# Patient Record
Sex: MALE | Race: BLACK OR AFRICAN AMERICAN | NOT HISPANIC OR LATINO | Employment: FULL TIME | URBAN - METROPOLITAN AREA
[De-identification: names, ages, dates, MRNs, and addresses within clinical notes are randomized per-mention and may not be internally consistent; named-entity substitution may affect disease eponyms.]

---

## 2017-07-12 ENCOUNTER — ALLSCRIPTS OFFICE VISIT (OUTPATIENT)
Dept: OTHER | Facility: OTHER | Age: 52
End: 2017-07-12

## 2017-08-06 DIAGNOSIS — Z00.00 ENCOUNTER FOR GENERAL ADULT MEDICAL EXAMINATION WITHOUT ABNORMAL FINDINGS: ICD-10-CM

## 2017-08-06 DIAGNOSIS — Z12.5 ENCOUNTER FOR SCREENING FOR MALIGNANT NEOPLASM OF PROSTATE: ICD-10-CM

## 2017-08-06 DIAGNOSIS — R79.89 OTHER SPECIFIED ABNORMAL FINDINGS OF BLOOD CHEMISTRY: ICD-10-CM

## 2017-08-08 ENCOUNTER — ALLSCRIPTS OFFICE VISIT (OUTPATIENT)
Dept: OTHER | Facility: OTHER | Age: 52
End: 2017-08-08

## 2017-10-20 ENCOUNTER — TRANSCRIBE ORDERS (OUTPATIENT)
Dept: ADMINISTRATIVE | Facility: HOSPITAL | Age: 52
End: 2017-10-20

## 2017-10-20 DIAGNOSIS — G47.30 INSOMNIA WITH SLEEP APNEA: Primary | ICD-10-CM

## 2017-10-20 DIAGNOSIS — G47.00 INSOMNIA WITH SLEEP APNEA: Primary | ICD-10-CM

## 2017-10-26 ENCOUNTER — HOSPITAL ENCOUNTER (OUTPATIENT)
Dept: SLEEP CENTER | Facility: CLINIC | Age: 52
Discharge: HOME | End: 2017-10-26
Payer: COMMERCIAL

## 2017-10-26 DIAGNOSIS — G47.00 INSOMNIA WITH SLEEP APNEA: ICD-10-CM

## 2017-10-26 DIAGNOSIS — G47.30 INSOMNIA WITH SLEEP APNEA: ICD-10-CM

## 2017-10-26 PROCEDURE — 95810 POLYSOM 6/> YRS 4/> PARAM: CPT

## 2017-11-04 ENCOUNTER — TRANSCRIBE ORDERS (OUTPATIENT)
Dept: ADMINISTRATIVE | Age: 52
End: 2017-11-04

## 2017-11-04 ENCOUNTER — APPOINTMENT (OUTPATIENT)
Dept: LAB | Age: 52
End: 2017-11-04
Payer: COMMERCIAL

## 2017-11-04 DIAGNOSIS — R79.89 OTHER SPECIFIED ABNORMAL FINDINGS OF BLOOD CHEMISTRY: ICD-10-CM

## 2017-11-04 DIAGNOSIS — Z00.00 ENCOUNTER FOR GENERAL ADULT MEDICAL EXAMINATION WITHOUT ABNORMAL FINDINGS: ICD-10-CM

## 2017-11-04 DIAGNOSIS — Z12.5 ENCOUNTER FOR SCREENING FOR MALIGNANT NEOPLASM OF PROSTATE: ICD-10-CM

## 2017-11-04 LAB
ALBUMIN SERPL BCP-MCNC: 4.1 G/DL (ref 3.5–5)
ALP SERPL-CCNC: 58 U/L (ref 46–116)
ALT SERPL W P-5'-P-CCNC: 32 U/L (ref 12–78)
ANION GAP SERPL CALCULATED.3IONS-SCNC: 9 MMOL/L (ref 4–13)
AST SERPL W P-5'-P-CCNC: 28 U/L (ref 5–45)
BASOPHILS # BLD AUTO: 0.04 THOUSANDS/ΜL (ref 0–0.1)
BASOPHILS NFR BLD AUTO: 1 % (ref 0–1)
BILIRUB SERPL-MCNC: 0.65 MG/DL (ref 0.2–1)
BUN SERPL-MCNC: 12 MG/DL (ref 5–25)
CALCIUM SERPL-MCNC: 9.1 MG/DL (ref 8.3–10.1)
CHLORIDE SERPL-SCNC: 108 MMOL/L (ref 100–108)
CHOLEST SERPL-MCNC: 205 MG/DL (ref 50–200)
CO2 SERPL-SCNC: 24 MMOL/L (ref 21–32)
CREAT SERPL-MCNC: 0.59 MG/DL (ref 0.6–1.3)
EOSINOPHIL # BLD AUTO: 0.13 THOUSAND/ΜL (ref 0–0.61)
EOSINOPHIL NFR BLD AUTO: 4 % (ref 0–6)
ERYTHROCYTE [DISTWIDTH] IN BLOOD BY AUTOMATED COUNT: 12.4 % (ref 11.6–15.1)
GFR SERPL CREATININE-BSD FRML MDRD: 115 ML/MIN/1.73SQ M
GLUCOSE P FAST SERPL-MCNC: 83 MG/DL (ref 65–99)
HCT VFR BLD AUTO: 41.8 % (ref 36.5–49.3)
HDLC SERPL-MCNC: 73 MG/DL (ref 40–60)
HGB BLD-MCNC: 15 G/DL (ref 12–17)
LDLC SERPL CALC-MCNC: 100 MG/DL (ref 0–100)
LYMPHOCYTES # BLD AUTO: 1.16 THOUSANDS/ΜL (ref 0.6–4.47)
LYMPHOCYTES NFR BLD AUTO: 32 % (ref 14–44)
MCH RBC QN AUTO: 34.6 PG (ref 26.8–34.3)
MCHC RBC AUTO-ENTMCNC: 35.9 G/DL (ref 31.4–37.4)
MCV RBC AUTO: 96 FL (ref 82–98)
MONOCYTES # BLD AUTO: 0.4 THOUSAND/ΜL (ref 0.17–1.22)
MONOCYTES NFR BLD AUTO: 11 % (ref 4–12)
NEUTROPHILS # BLD AUTO: 1.91 THOUSANDS/ΜL (ref 1.85–7.62)
NEUTS SEG NFR BLD AUTO: 52 % (ref 43–75)
NRBC BLD AUTO-RTO: 0 /100 WBCS
PLATELET # BLD AUTO: 241 THOUSANDS/UL (ref 149–390)
PMV BLD AUTO: 9.8 FL (ref 8.9–12.7)
POTASSIUM SERPL-SCNC: 3.8 MMOL/L (ref 3.5–5.3)
PROT SERPL-MCNC: 7.4 G/DL (ref 6.4–8.2)
PSA SERPL-MCNC: 2.8 NG/ML (ref 0–4)
RBC # BLD AUTO: 4.34 MILLION/UL (ref 3.88–5.62)
SODIUM SERPL-SCNC: 141 MMOL/L (ref 136–145)
TRIGL SERPL-MCNC: 158 MG/DL
WBC # BLD AUTO: 3.65 THOUSAND/UL (ref 4.31–10.16)

## 2017-11-04 PROCEDURE — G0103 PSA SCREENING: HCPCS

## 2017-11-04 PROCEDURE — 85025 COMPLETE CBC W/AUTO DIFF WBC: CPT

## 2017-11-04 PROCEDURE — 36415 COLL VENOUS BLD VENIPUNCTURE: CPT

## 2017-11-04 PROCEDURE — 80053 COMPREHEN METABOLIC PANEL: CPT

## 2017-11-04 PROCEDURE — 80061 LIPID PANEL: CPT

## 2017-11-05 ENCOUNTER — GENERIC CONVERSION - ENCOUNTER (OUTPATIENT)
Dept: OTHER | Facility: OTHER | Age: 52
End: 2017-11-05

## 2017-12-05 ENCOUNTER — ALLSCRIPTS OFFICE VISIT (OUTPATIENT)
Dept: OTHER | Facility: OTHER | Age: 52
End: 2017-12-05

## 2017-12-05 ENCOUNTER — TRANSCRIBE ORDERS (OUTPATIENT)
Dept: ADMINISTRATIVE | Facility: HOSPITAL | Age: 52
End: 2017-12-05

## 2017-12-05 ENCOUNTER — GENERIC CONVERSION - ENCOUNTER (OUTPATIENT)
Dept: OTHER | Facility: OTHER | Age: 52
End: 2017-12-05

## 2017-12-05 DIAGNOSIS — M54.12 RADICULOPATHY OF CERVICAL REGION: ICD-10-CM

## 2017-12-05 DIAGNOSIS — H53.2 DIPLOPIA: ICD-10-CM

## 2017-12-05 DIAGNOSIS — M54.12 BRACHIAL NEURITIS: Primary | ICD-10-CM

## 2017-12-05 DIAGNOSIS — R53.83 OTHER FATIGUE: ICD-10-CM

## 2017-12-08 ENCOUNTER — GENERIC CONVERSION - ENCOUNTER (OUTPATIENT)
Dept: INTERNAL MEDICINE CLINIC | Facility: CLINIC | Age: 52
End: 2017-12-08

## 2017-12-11 ENCOUNTER — TRANSCRIBE ORDERS (OUTPATIENT)
Dept: ADMINISTRATIVE | Facility: HOSPITAL | Age: 52
End: 2017-12-11

## 2017-12-11 ENCOUNTER — GENERIC CONVERSION - ENCOUNTER (OUTPATIENT)
Dept: OTHER | Facility: OTHER | Age: 52
End: 2017-12-11

## 2017-12-11 DIAGNOSIS — H49.11 FOURTH NERVE PALSY OF RIGHT EYE: ICD-10-CM

## 2017-12-11 DIAGNOSIS — H53.2 DOUBLE VISION: Primary | ICD-10-CM

## 2017-12-12 ENCOUNTER — APPOINTMENT (OUTPATIENT)
Dept: LAB | Age: 52
End: 2017-12-12
Payer: COMMERCIAL

## 2017-12-12 ENCOUNTER — HOSPITAL ENCOUNTER (OUTPATIENT)
Dept: RADIOLOGY | Age: 52
Discharge: HOME | End: 2017-12-12
Payer: COMMERCIAL

## 2017-12-12 ENCOUNTER — TRANSCRIBE ORDERS (OUTPATIENT)
Dept: ADMINISTRATIVE | Age: 52
End: 2017-12-12

## 2017-12-12 DIAGNOSIS — H49.11 FOURTH NERVE PALSY OF RIGHT EYE: ICD-10-CM

## 2017-12-12 DIAGNOSIS — H53.2 DIPLOPIA: Primary | ICD-10-CM

## 2017-12-12 DIAGNOSIS — H53.2 DIPLOPIA: ICD-10-CM

## 2017-12-12 DIAGNOSIS — M54.12 RADICULOPATHY OF CERVICAL REGION: ICD-10-CM

## 2017-12-12 LAB
CRP SERPL QL: <3 MG/L
ERYTHROCYTE [SEDIMENTATION RATE] IN BLOOD: 6 MM/HOUR (ref 0–10)
TSH SERPL DL<=0.05 MIU/L-ACNC: 2.32 UIU/ML (ref 0.36–3.74)

## 2017-12-12 PROCEDURE — 36415 COLL VENOUS BLD VENIPUNCTURE: CPT

## 2017-12-12 PROCEDURE — 84443 ASSAY THYROID STIM HORMONE: CPT

## 2017-12-12 PROCEDURE — 86140 C-REACTIVE PROTEIN: CPT

## 2017-12-12 PROCEDURE — 85652 RBC SED RATE AUTOMATED: CPT

## 2017-12-12 PROCEDURE — 72141 MRI NECK SPINE W/O DYE: CPT

## 2017-12-12 NOTE — PROGRESS NOTES
Assessment    1  Cervical radiculopathy (723 4) (M54 12)   2  Carpal tunnel syndrome, unspecified laterality (354 0) (G56 00)   · EMG done on 4/24/2012 with left sided carpal tunnel syndrome  Plan   Carpal tunnel syndrome, unspecified laterality    · 1 - Jaki LOERA, Paul Pitts  (Orthopedic Surgery) Co-Management  *  Status: Active Requested for: 02CNS6948  Care Summary provided  : Yes  * MRI CERVICAL SPINE WO CONTRAST; Status:Need Information - Financial Authorization; Requested for:93Xnt1390;  Perform:[facility] Radiology; [phone]; Ordered; For:Cervical radiculopathy; Ordered By:Breonna Browning;    Discussion/Summary    See dr Paola Mott cervical spine  Chief Complaint  Patient presents today for neck tenderness with left arm numbness  History of Present Illness  HPI: patient had calcium deposit in the Cervical spinemonths of bilateral 3,4,5th finger numbnesswhen lying on his sidelying on his stomach with a pillowon a hard surface bothers it too  been diagnosed with vertical diplopia, needs to see dr Óscar Mitchell      Review of Systems   Constitutional: no fever,-- not feeling poorly,-- no chills-- and-- not feeling tired  ENT: no earache,-- no nosebleeds-- and-- no nasal discharge  Cardiovascular: the heart rate was not slow,-- no chest pain,-- the heart rate was not fast-- and-- no palpitations  Respiratory: no shortness of breath,-- no cough,-- no wheezing-- and-- no shortness of breath during exertion  Gastrointestinal: no abdominal pain,-- no nausea,-- no constipation-- and-- no diarrhea  Genitourinary: no dysuria  Musculoskeletal: no arthralgias-- and-- no myalgias  Integumentary: no rashes,-- no itching,-- no skin lesions-- and-- no skin wound  Neurological: no headache,-- no numbness-- and-- no dizziness  Active Problems    1  Abnormal LFTs (liver function tests) (790 6) (R79 89)   2  Allergic rhinitis (477 9) (J30 9)   3   Anxiety disorder (300 00) (F41 9)   4  Carpal tunnel syndrome, unspecified laterality (354 0) (G56 00)   5  Degeneration of cervical intervertebral disc (722 4) (M50 90)   6  Encounter for screening for malignant neoplasm of colon (V76 51) (Z12 11)   7  Fatigue (780 79) (R53 83)   8  Gastroesophageal reflux disease (530 81) (K21 9)   9  Laboratory examination ordered as part of a routine general medical examination (V72 62) (Z00 00)   10  Long term use of drug (V58 69) (Z79 899)   11  Need for diphtheria-tetanus-pertussis (Tdap) vaccine (V06 1) (Z23)   12  Need for prophylactic vaccination against single diseases (V05 9) (Z23)   13  Rosacea (695 3) (L71 9)   14  Snoring (786 09) (R06 83)   15  Special screening examination for neoplasm of prostate (V76 44) (Z12 5)    Past Medical History  Active Problems And Past Medical History Reviewed: The active problems and past medical history were reviewed and updated today  Surgical History  Surgical History Reviewed: The surgical history was reviewed and updated today  Social History     · Current non-drinker of alcohol (V49 89) (Z78 9)   · Exercising Regularly   ·    · Never a smoker   · Non smoker / tobacco user (V49 89) (Z78 9)   · Two children  The social history was reviewed and updated today  The social history was reviewed and is unchanged  Family History  Family History Reviewed: The family history was reviewed and updated today  Current Meds   1  ALPRAZolam 0 25 MG Oral Tablet; TAKE  1 TABLET TWICE DAILY AS NEEDED; Therapy: 05Zso9143 to (Evaluate:06Nov2017); Last Rx:91Hem5221 Ordered   2  Fluticasone Propionate 50 MCG/ACT Nasal Suspension; Use 1 to 2 sprays to both nostrils daily during allergy season  Avoid spraying the nasal septum; Therapy: 87Trd5524 to (Evaluate:13Oct2016)  Requested for: 00DCM2193; Last Rx:19Oct2015 Ordered   3  Omeprazole 40 MG Oral Capsule Delayed Release; take 1 capsule daily in the morning;  Therapy: 22GCE2903 to (([phone])  Requested for: 68Hbd2035; Last Rx: 58SUV3250 Ordered    The medication list was reviewed and updated today  Allergies  1  No Known Drug Allergies  2  Seasonal    Vitals   Recorded: 98MYO4393 04:07PM   Heart Rate 82   Respiration 18   Systolic 342   Diastolic 66   Height 5 ft 9 in   Weight 176 lb 0 6 oz   BMI Calculated 26   BSA Calculated 1 96   O2 Saturation 95       Physical Exam   Constitutional  General appearance: No acute distress, well appearing and well nourished  Eyes  Conjunctiva and lids: No swelling, erythema, or discharge  Pupils and irises: Equal, round and reactive to light  Ears, Nose, Mouth, and Throat  External inspection of ears and nose: Normal    Otoscopic examination: Tympanic membrance translucent with normal light reflex  Canals patent without erythema  Nasal mucosa, septum, and turbinates: Normal without edema or erythema  Oropharynx: Normal with no erythema, edema, exudate or lesions  Pulmonary  Respiratory effort: No increased work of breathing or signs of respiratory distress  Auscultation of lungs: Clear to auscultation, equal breath sounds bilaterally, no wheezes, no rales, no rhonci  Cardiovascular  Auscultation of heart: Normal rate and rhythm, normal S1 and S2, without murmurs  Examination of extremities for edema and/or varicosities: Normal    Abdomen  Abdomen: Non-tender, no masses  Liver and spleen: No hepatomegaly or splenomegaly  Lymphatic  Palpation of lymph nodes in neck: No lymphadenopathy  Musculoskeletal  Gait and station: Normal    Digits and nails: Normal without clubbing or cyanosis  Inspection/palpation of joints, bones, and muscles: Abnormal  -- neck pain, finger parensthsia  Skin  Skin and subcutaneous tissue: Normal without rashes or lesions     Psychiatric  Orientation to person, place and time: Normal    Mood and affect: Normal          Signatures   Electronically signed by : Karuna Humphries; Dec 11 2017 10:49AM EST                       (Author)    Electronically signed by : DUONG Go ; Dec 11 2017  1:48PM EST                       (Review)

## 2017-12-14 ENCOUNTER — GENERIC CONVERSION - ENCOUNTER (OUTPATIENT)
Dept: OTHER | Facility: OTHER | Age: 52
End: 2017-12-14

## 2017-12-19 ENCOUNTER — GENERIC CONVERSION - ENCOUNTER (OUTPATIENT)
Dept: OTHER | Facility: OTHER | Age: 52
End: 2017-12-19

## 2017-12-19 ENCOUNTER — HOSPITAL ENCOUNTER (OUTPATIENT)
Dept: RADIOLOGY | Facility: HOSPITAL | Age: 52
Discharge: HOME | End: 2017-12-19
Payer: COMMERCIAL

## 2017-12-19 DIAGNOSIS — H53.2 DOUBLE VISION: ICD-10-CM

## 2017-12-19 PROCEDURE — A9585 GADOBUTROL INJECTION: HCPCS | Performed by: RADIOLOGY

## 2017-12-19 PROCEDURE — 70553 MRI BRAIN STEM W/O & W/DYE: CPT

## 2017-12-19 RX ADMIN — GADOBUTROL 7 ML: 604.72 INJECTION INTRAVENOUS at 21:50

## 2017-12-21 ENCOUNTER — GENERIC CONVERSION - ENCOUNTER (OUTPATIENT)
Dept: OTHER | Facility: OTHER | Age: 52
End: 2017-12-21

## 2017-12-30 ENCOUNTER — TRANSCRIBE ORDERS (OUTPATIENT)
Dept: NON INVASIVE DIAGNOSTICS | Facility: CLINIC | Age: 52
End: 2017-12-30

## 2017-12-30 ENCOUNTER — APPOINTMENT (OUTPATIENT)
Dept: LAB | Age: 52
End: 2017-12-30
Payer: COMMERCIAL

## 2017-12-30 DIAGNOSIS — R53.83 OTHER FATIGUE: ICD-10-CM

## 2017-12-30 DIAGNOSIS — H53.2 DIPLOPIA: ICD-10-CM

## 2017-12-30 LAB
CORTIS AM PEAK SERPL-MCNC: 20.4 UG/DL (ref 4.2–22.4)
TESTOST SERPL-MCNC: 328 NG/DL (ref 95–948)

## 2017-12-30 PROCEDURE — 82533 TOTAL CORTISOL: CPT

## 2017-12-30 PROCEDURE — 84403 ASSAY OF TOTAL TESTOSTERONE: CPT

## 2017-12-30 PROCEDURE — 36415 COLL VENOUS BLD VENIPUNCTURE: CPT

## 2018-01-09 ENCOUNTER — APPOINTMENT (OUTPATIENT)
Dept: AUDIOLOGY | Age: 53
End: 2018-01-09
Payer: COMMERCIAL

## 2018-01-09 PROCEDURE — 92557 COMPREHENSIVE HEARING TEST: CPT | Performed by: AUDIOLOGIST

## 2018-01-09 PROCEDURE — 92591 HB HEARING AID EXAM BOTH EARS: CPT | Performed by: AUDIOLOGIST

## 2018-01-09 PROCEDURE — 92567 TYMPANOMETRY: CPT | Performed by: AUDIOLOGIST

## 2018-01-09 NOTE — PROGRESS NOTES
Assessment    1  Encounter for preventive health examination (V70 0) (Z00 00)    Plan   Abnormal LFTs (liver function tests), Laboratory examination ordered as part of a routine  general medical examination, Special screening examination for neoplasm of prostate    · (1) CBC/PLT/DIFF; Status:Active; Requested for:37Ydl2039;    · (1) COMPREHENSIVE METABOLIC PANEL; Status:Active; Requested for:66Rqm3454;    · (1) LIPID PANEL FASTING W DIRECT LDL REFLEX; Status:Active; Requested  for:76Pze9972;    · (1) PSA (SCREEN) (Dx V76 44 Screen for Prostate Cancer); Status:Active; Requested  for:56Zcw4455;   Gastroesophageal reflux disease    · Omeprazole 40 MG Oral Capsule Delayed Release; take 1 capsule daily in the  morning    1 Josee Graham MD, Vonnie Morales (Otolaryngology) Co-Management  *  Status: Hold For - Scheduling  Requested for: 72ULJ8714  Ordered; For: Allergic rhinitis; Ordered By: Odalis Courts  Performed:   Due: 12QNT2499     Discussion/Summary  Impression: health maintenance visit  Currently, he eats an adequate diet and has an adequate exercise regimen  Prostate cancer screening: PSA was ordered  Colorectal cancer screening: colorectal cancer screening is current  The immunizations are up to date  He was advised to be evaluated by ENT  Chronic sinus symptoms-see ENT  The patient was counseled regarding impressions  Possible side effects of new medications were reviewed with the patient/guardian today  The treatment plan was reviewed with the patient/guardian  The patient/guardian understands and agrees with the treatment plan      Chief Complaint  Wellness      History of Present Illness  HM, Adult Male: The patient is being seen for a health maintenance evaluation  The last health maintenance visit was 1 year(s) ago  Social History: Household members include spouse and Now has custody of grandchild  He is   Work status: working full time  The patient has never smoked cigarettes   He reports never drinking alcohol  He has never used illicit drugs  General Health: The patient's health since the last visit is described as good  He has regular dental visits  He complains of vision problems  Vision care includes wearing glasses and having regular eye examinations  He has hearing loss  hearing is slightly decreased   He doesn't wear a hearing aid  Immunizations status: up to date  Lifestyle:  He consumes a diverse and healthy diet  He is on a diet and is generally adherent  He does not have any weight concerns  He exercises regularly  He exercises 3 or more times per week for 30 or more minutes per session  Exercise includes biking  He does not use tobacco  He denies alcohol use  He reports never drinking alcohol  He denies drug use  He has never used illicit drugs  Reproductive health:  the patient is sexually active  Screening: Prostate cancer screening includes prostate-specific antigen testing last year  Colorectal cancer screening includes last colonoscopy done last year  metabolic screening reviewed and current  Cardiovascular risk factors: no hypertension, no diabetes, no high LDL cholesterol, no low HDL cholesterol, no obesity and no tobacco use  HPI: Chronic- runny nose sinus drainage coughing when he eats       Review of Systems    Constitutional: no fever, no recent weight gain, no chills and no recent weight loss  ENT: as noted in HPI and no hearing loss  Cardiovascular: no chest pain and no palpitations  Respiratory: no shortness of breath and no cough  Gastrointestinal: no abdominal pain, no constipation and no diarrhea  Genitourinary: no dysuria, no incontinence and no nocturia  Musculoskeletal: no arthralgias  Psychiatric: no anxiety, no sleep disturbances and no depression  Active Problems    1  Abnormal LFTs (liver function tests) (790 6) (R79 89)   2  Allergic rhinitis (477 9) (J30 9)   3  Carpal tunnel syndrome, unspecified laterality (354 0) (G56 00)   4  Degeneration of cervical intervertebral disc (722 4) (M50 90)   5  Encounter for screening for malignant neoplasm of colon (V76 51) (Z12 11)   6  Gastroesophageal reflux disease (530 81) (K21 9)   7  Laboratory examination ordered as part of a routine general medical examination   (V72 62) (Z00 00)   8  Long term use of drug (V58 69) (Z79 899)   9  Need for diphtheria-tetanus-pertussis (Tdap) vaccine (V06 1) (Z23)   10  Need for prophylactic vaccination against single diseases (V05 9) (Z23)   11  Rosacea (695 3) (L71 9)   12  Special screening examination for neoplasm of prostate (V76 44) (Z12 5)    Past Medical History    · History of Limb pain (729 5) (M79 609)   · History of Plantar fasciitis (728 71) (M72 2)    Surgical History    · History of Diagnostic Esophagogastroduodenoscopy   · History of Shoulder Surgery Right   · History of Surgery Vas Deferens Vasectomy   · History of Tonsillectomy With Adenoidectomy    Family History  Mother    · Family history of Family Health Status Of Mother - Good   · Family history of hypertension (V17 49) (Z82 49)   · Family history of Mother's Year Of Birth  Father    · Family history of Diabetes Mellitus (V18 0)   · Family history of congestive heart failure (V17 49) (Z82 49)   · Family history of type 2 diabetes mellitus (V18 0) (Z83 3)   · Family history of Father's Year Of Birth   · Family history of Heart Valve Replacement   · Family history of S/P AVR (aortic valve replacement)  Brother    · Family history of Family Health Status Brother 1   · Family history of Family Health Status Brother 2  Maternal Grandfather    · Family history of diabetes mellitus (V18 0) (Z83 3)    Social History    · Current non-drinker of alcohol (V49 89) (Z78 9)   · Exercising Regularly   ·    · Non smoker / tobacco user (V49 89) (Z78 9)   · Two children    Current Meds   1  Doxycycline Hyclate 100 MG Oral Capsule; TAKE 1 CAPSULE EVERY 12 HOURS   DAILY;    Therapy: 90JLP8054 to (Evaluate:53Wmg8004)  Requested for: 51DHT0881; Last   Rx:44Vxm9831 Ordered   2  Fluticasone Propionate 50 MCG/ACT Nasal Suspension; Use 1 to 2 sprays to both   nostrils daily during allergy season  Avoid spraying the nasal septum; Therapy: 47Ofg1895 to (Evaluate:13Oct2016)  Requested for: 42SOW3962; Last   Rx:69Xbk4894 Ordered   3  Omeprazole 20 MG Oral Capsule Delayed Release; take 1 capsule daily; Therapy: 15VQW4688 to (Evaluate:24Dnn5214)  Requested for: 90LFJ3933; Last   Rx:98Ixm4407 Ordered    Allergies    1  No Known Drug Allergies    2  Seasonal    Vitals   Recorded: 12Jul2017 04:06PM   Heart Rate 98   Systolic 205   Diastolic 82   Height 5 ft 9 in   Weight 174 lb    BMI Calculated 25 7   BSA Calculated 1 95   O2 Saturation 97     Physical Exam    Constitutional   General appearance: No acute distress, well appearing and well nourished  Head and Face   Head and face: Normal     Eyes   Conjunctiva and lids: No erythema, swelling or discharge  Ears, Nose, Mouth, and Throat   Otoscopic examination: Tympanic membranes translucent with normal light reflex  Canals patent without erythema  Oropharynx: Normal with no erythema, edema, exudate or lesions  Neck   Neck: Supple, symmetric, trachea midline, no masses  Thyroid: Normal, no thyromegaly  Pulmonary   Respiratory effort: No increased work of breathing or signs of respiratory distress  Auscultation of lungs: Clear to auscultation  Cardiovascular   Auscultation of heart: Normal rate and rhythm, normal S1 and S2, no murmurs  Abdomen   Abdomen: Non-tender, no masses  Lymphatic   Palpation of lymph nodes in neck: No lymphadenopathy      Musculoskeletal   Gait and station: Normal     Psychiatric   Orientation to person, place and time: Normal     Mood and affect: Normal        Results/Data  (1) BASIC METABOLIC PROFILE 95BYT3617 07:48AM [facility] Order Number: [id number]     Test Name Result Flag Reference GLUCOSE,RANDM 88 mg/dL     If the patient is fasting, the ADA then defines impaired fasting glucose as > 100 mg/dL and diabetes as > or equal to 123 mg/dL  SODIUM 140 mmol/L  136-145   POTASSIUM 3 7 mmol/L  3 5-5 3   CHLORIDE 106 mmol/L  100-108   CARBON DIOXIDE 25 mmol/L  21-32   ANION GAP (CALC) 9 mmol/L  4-13   BLOOD UREA NITROGEN 13 mg/dL  5-25   CREATININE 0 67 mg/dL  0 60-1 30   Standardized to IDMS reference method   CALCIUM 8 9 mg/dL  8 3-10 1   eGFR Non-African American      >60 0 ml/min/1 73sq m   [facility] Disease Education Program recommendations are as follows:  GFR calculation is accurate only with a steady state creatinine  Chronic Kidney disease less than 60 ml/min/1 73 sq  meters  Kidney failure less than 15 ml/min/1 73 sq  meters       (1) HEPATIC FUNCTION PANEL 06Aug2016 07:48AM Vickie Randhawa Order Number: [id number]     Test Name Result Flag Reference   ALBUMIN 3 8 g/dL  3 5-5 0   ALK PHOSPHATAS 63 U/L     ALT (SGPT) 56 U/L  12-78   AST(SGOT) 64 U/L H 5-45   BILI, DIRECT 0 16 mg/dL  0 00-0 20   BILI, TOTAL 0 56 mg/dL  0 20-1 00   TOTAL PROTEIN 6 8 g/dL  6 4-8 2     (1) LIPID PANEL FASTING W DIRECT LDL REFLEX 85Klt6282 07:48AM Vickie Randhawa Order Number: [id number]     Test Name Result Flag Reference   CHOLESTEROL 179 mg/dL     LDL CHOLESTEROL CALCULATED 77 mg/dL  0-100   Triglyceride:         Normal              <150 mg/dl       Borderline High    150-199 mg/dl       High               200-499 mg/dl       Very High          >499 mg/dl  Cholesterol:         Desirable        <200 mg/dl      Borderline High  200-239 mg/dl      High             >239 mg/dl  HDL Cholesterol:        High    >59 mg/dL      Low     <41 mg/dL  LDL Cholesterol:        Optimal          <100 mg/dl        Near Optimal     100-129 mg/dl        Above Optimal          Borderline High   130-159 mg/dl          High              160-189 mg/dl          Very High >189 mg/dl  LDL CALCULATED:    This screening LDL is a calculated result  It does not have the accuracy of the Direct Measured LDL in the monitoring of patients with hyperlipidemia and/or statin therapy  Direct Measure LDL (YFY644) must be ordered separately in these patients  TRIGLYCERIDES 197 mg/dL H <=150   Specimen collection should occur prior to N-Acetylcysteine or Metamizole administration due to the potential for falsely depressed results  HDL,DIRECT 63 mg/dL H 40-60   Specimen collection should occur prior to Metamizole administration due to the potential for falsely depressed results  (1) PSA (SCREEN) (Dx V76 44 Screen for Prostate Cancer) 06Aug2016 07:48AM [facility] Order Number: [id number]     Test Name Result Flag Reference   PROSTATE SPECIFIC ANTIGEN 2 6 ng/mL  0 0-4 0     (1) TSH WITH FT4 REFLEX 86Ubo7772 07:48AM Royer Zedmo Order Number: [id number]     Test Name Result Flag Reference   TSH 1 590 uIU/mL  0 358-3 740   Patients undergoing fluorescein dye angiography may retain small amounts of fluorescein in the body for 48-72 hours post procedure  Samples containing fluorescein can produce falsely depressed TSH values  If the patient had this procedure,a specimen should be resubmitted post fluorescein clearance  Health Management  Encounter for screening for malignant neoplasm of colon   COLONOSCOPY; every 10 years; Last 01TOD5977; Next Due: 93Zke2027;  Active    Signatures   Electronically signed by : DUONG Rebollar ; Jul 17 2017  7:35PM EST                       (Author)

## 2018-01-10 NOTE — PROGRESS NOTES
Assessment    1  Encounter for preventive health examination (V70 0) (Z00 00)   2  Non smoker / tobacco user (V49 89) (Z78 9)   3  Two children   4  Current non-drinker of alcohol (V49 89) (Z78 9)   5  Family history of Heart Valve Replacement : Father   10  Family history of type 2 diabetes mellitus (V18 0) (Z83 3) : Father   7  Family history of congestive heart failure (V17 49) (Z82 49) : Father   8  History of Surgery Vas Deferens Vasectomy   9  History of Tonsillectomy With Adenoidectomy   10  History of Shoulder Surgery Right   11  Laboratory examination ordered as part of a routine general medical examination    (V72 62) (Z00 00)   12  Special screening examination for neoplasm of prostate (V76 44) (Z12 5)   13  Abnormal LFTs (liver function tests) (790 6) (R79 89)   14  Family history of hypertension (V17 49) (Z82 49) : Mother   13  Family history of diabetes mellitus (V18 0) (Z83 3) : Maternal Grandfather   16  Family history of S/P AVR (aortic valve replacement) (V43 3) (Z95 2) : Father   16  Rosacea (695 3) (L71 9)   18  Gastroesophageal reflux disease (530 81) (K21 9)    Plan   Abnormal LFTs (liver function tests), Health Maintenance, Laboratory examination  ordered as part of a routine general medical examination, Long term  use of drug, Special screening examination for neoplasm of prostate    · (1) TSH WITH FT4 REFLEX; Status:Active; Requested for:04Bbz9394; Abnormal LFTs (liver function tests), Health Maintenance, Laboratory examination  ordered as part of a routine general medical examination, Special screening  examination for neoplasm of prostate    · (1) [place]; Status:Active; Requested for:93Ted5613;    · (1) CBC/PLT/DIFF; Status:Active; Requested for:82Xuv6038;    · (1) HEP C ANTIBODY; Status:Active; Requested for:37Axv6058;    · (1) HEPATIC FUNCTION PANEL; Status:Active;  Requested for:21Nzi0926;    · (1) LIPID PANEL FASTING W DIRECT LDL REFLEX; Status:Active; Requested  for:19Tht9866;    · (1) PSA (SCREEN) (Dx V76 44 Screen for Prostate Cancer); Status:Active; Requested  for:48Cjw5207;   Dyspepsia    · Omeprazole 20 MG Oral Capsule Delayed Release; take 1 capsule daily  Health Maintenance    · Always use a seat belt and shoulder strap when riding or driving a motor vehicle ;  Status:Complete;   Done: 75KMF7923   · Brush your teeth freq1 and floss at least once a day ; Status:Complete;   Done:  53LEY5661   · Use a sun block product with an SPF of 15 or more ; Status:Complete;   Done: 33QXW8286   · We recommend routine visits to a dentist ; Status:Complete;   Done: 02VTN3126   · Call [phone] if: You have any warning signs of skin cancer ; Status:Complete;    Done: 34ZJX9990   · Call [phone] if: You experience a new kind of chest pain (angina) or pressure ;  Status:Complete;   Done: 99FGU9852  Rosacea    · Doxycycline Hyclate 100 MG Oral Capsule; TAKE 1 CAPSULE EVERY 12  HOURS DAILY    Follow-up visit in 1 year Evaluation and Treatment  Follow-up  Status: Hold For - Scheduling  Requested for: 83JVM2292  Ordered; For: Abnormal LFTs (liver function tests), Health Maintenance, Laboratory examination ordered as part of a routine general medical examination, Special screening examination for neoplasm of prostate;  Ordered By: Danny Padilla  Performed:   Due: 99DTV2225     Discussion/Summary  Impression: health maintenance visit, healthy adult male  Currently, he eats a healthy diet and has an adequate exercise regimen  Prostate cancer screening: prostate cancer screening is current and PSA was ordered  Colorectal cancer screening: colorectal cancer screening is current and colonoscopy is needed every ten years  Advice and education were given regarding sunscreen use and seat belt use  Patient discussion: discussed with the patient  Chief Complaint  HWN-The patient presents for a wellness visit   BK       History of Present Illness  HM, Adult Male: The last health maintenance visit was 1 year(s) ago  Social History: Household members include spouse  He is   Work status: working full time and occupation:   The patient has never smoked cigarettes  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  The patient has no concerns about alcohol abuse  He has never used illicit drugs  General Health: The patient's health since the last visit is described as good  He does not have regular dental visits  Dental problems: no tooth pain and no caries  He denies vision problems  Vision care includes wearing reading glasses  He has hearing loss  Immunizations status: up to date  Lifestyle:  He consumes a diverse and healthy diet  He does not have any weight concerns  He exercises regularly  He does not use tobacco  He denies alcohol use  He denies drug use  Reproductive health:  the patient is sexually active  Screening: Prostate cancer screening includes prostate-specific antigen testing last year  Colorectal cancer screening includes last colonoscopy done February 2016  Safety elements used: seat belt, bicycle helmet, safe driving habits, sunscreen, smoke detector and carbon monoxide detector  HPI: Flaquita is a pleasant 53-year-old gentleman who presents for a wellness visit  BK       Review of Systems    Cardiovascular: No complaints of slow heart rate, no fast heart rate, no chest pain, no palpitations, no leg claudication, no lower extremity  Respiratory: No complaints of shortness of breath, no wheezing, no cough, no SOB on exertion, no orthopnea or PND  Gastrointestinal: No complaints of abdominal pain, no constipation, no nausea or vomiting, no diarrhea or bloody stools  Genitourinary: No complaints of dysuria, no incontinence, no hesitancy, no nocturia, no genital lesion, no testicular pain  ROS reviewed  Active Problems    1  Abnormal LFTs (liver function tests) (790 6) (R79 89)   2   Allergic rhinitis (477 9) (J30 9)   3  Carpal tunnel syndrome, unspecified laterality (354 0) (G56 00)   4  Degeneration of cervical intervertebral disc (722 4) (M50 90)   5  Encounter for screening for malignant neoplasm of colon (V76 51) (Z12 11)   6  Gastroesophageal reflux disease (530 81) (K21 9)   7  Laboratory examination ordered as part of a routine general medical examination   (V72 62) (Z00 00)   8  Long term use of drug (V58 69) (Z79 899)   9  Need for diphtheria-tetanus-pertussis (Tdap) vaccine (V06 1) (Z23)   10  Need for prophylactic vaccination against single diseases (V05 9) (Z23)   11  Rosacea (695 3) (L71 9)   12  Special screening examination for neoplasm of prostate (V76 44) (Z12 5)    Past Medical History    · History of Limb pain (729 5) (M79 609)   · History of Plantar fasciitis (728 71) (M72 2)    Surgical History    · History of Diagnostic Esophagogastroduodenoscopy   · History of Shoulder Surgery Right   · History of Surgery Vas Deferens Vasectomy   · History of Tonsillectomy With Adenoidectomy    Family History  Mother    · Family history of Family Health Status Of Mother - Good   · Family history of hypertension (V17 49) (Z82 49)   · Family history of Mother's Year Of Birth  Father    · Family history of Diabetes Mellitus (V18 0)   · Family history of congestive heart failure (V17 49) (Z82 49)   · Family history of type 2 diabetes mellitus (V18 0) (Z83 3)   · Family history of Father's Year Of Birth   · Family history of Heart Valve Replacement   · Family history of S/P AVR (aortic valve replacement) (V43 3) (Z95 2)  Brother    · Family history of Family Health Status Brother 1   · Family history of Family Health Status Brother 2  Maternal Grandfather    · Family history of diabetes mellitus (V18 0) (Z83 3)    Social History    · Current non-drinker of alcohol (V49 89) (Z78 9)   · Exercising Regularly   ·    · Non smoker / tobacco user (V49 89) (Z78 9)   · Two children    Current Meds   1   Doxycycline Hyclate 100 MG Oral Capsule; TAKE 1 CAPSULE EVERY 12 HOURS   DAILY; Therapy: 02PZZ6299 to (Evaluate:25Nzj0101)  Requested for: 29HRF6436; Last   Rx:19Jan2016 Ordered   2  Fluticasone Propionate 50 MCG/ACT Nasal Suspension; Use 1 to 2 sprays to both   nostrils daily during allergy season  Avoid spraying the nasal septum; Therapy: 85Mve6331 to (Evaluate:64Mtz0183)  Requested for: 06RKA6858; Last   Rx:19Oct2015 Ordered   3  Omeprazole 20 MG Oral Capsule Delayed Release; take 1 capsule daily; Therapy: 77SJA4512 to (Evaluate:59Ghx0223)  Requested for: 26FIQ3560; Last   Rx:19Jan2016 Ordered    Allergies    1  No Known Drug Allergies    2  Seasonal    Vitals   Recorded: 26EUF7360 57:53CI   Systolic 508   Diastolic 70   Heart Rate 80   Respiration 16   O2 Saturation 96   Height 5 ft 9 in   Weight 176 lb 0 2 oz   BMI Calculated 25 99   BSA Calculated 1 96     Physical Exam    Constitutional   General appearance: No acute distress, well appearing and well nourished  well developed, appears healthy, comfortable, well nourished, clothing appropriate, rested, not exhausted, well hydrated and appearance reflects stated age  Eyes   Conjunctiva and lids: No erythema, swelling or discharge  Ears, Nose, Mouth, and Throat   Hearing: Normal     Pulmonary   Respiratory effort: No increased work of breathing or signs of respiratory distress  Respiratory rate: normal  Assessment of respiratory effort revealed normal rhythm and effort  Auscultation of lungs: Clear to auscultation  no rales or crackles were heard bilaterally  no rhonchi  no friction rub  no wheezing  no diminished breath sounds  no bronchial breath sounds  Cardiovascular   Auscultation of heart: Normal rate and rhythm, normal S1 and S2, no murmurs  The heart rate was normal  The rhythm was regular  no murmurs were heard  Examination of extremities for edema and/or varicosities: Normal   no ankle edema and no pretibial edema  Abdomen   Abdomen: Non-tender, no masses    The abdomen was flat  Bowel sounds were normal  no masses palpated  Liver and spleen: No hepatomegaly or splenomegaly  No hepatosplenomegaly  Musculoskeletal   Gait and station: Normal     Psychiatric   Judgment and insight: Normal     Mood and affect: Normal        Results/Data  PHQ-2 Adult Depression Screening 31Ijg5887 04:27PM User, Sol     Test Name Result Flag Reference   PHQ-2 Adult Depression Score 0     Over the last two weeks, how often have you been bothered by any of the following problems? Little interest or pleasure in doing things: Not at all - 0  Feeling down, depressed, or hopeless: Not at all - 0   PHQ-2 Adult Depression Screening Negative         Provider Comments  #1  Health maintenance-a history and a physical exam were performed for him during his office visit  His allergy history, medication history, social history, family history, and past surgical history were reviewed with him and updated, as needed  A TLC care guide from the computer program was given to him to take to review  He will continue with annual wellness visits  #2  Colorectal cancer screening-he is following with his GI specialist for preventative care and his next colonoscopy will be due in February 2026  #3  GERD-he is doing well with his current medication  He had an EGD performed in February 2016 that was normal  He will continue with his current medication and clinical surveillance  #4  Prostate cancer screening-he is pursuing PSA surveillance  A PSA level was ordered for him  #5  Richard Tinoco is doing well with his current medication  He will continue with his current medication and clinical/laboratory surveillance  #6  Asymptomatic mild elevation of a liver function test-I asked him to perform follow-up laboratory testing  Question related to his medication  #7  See my note of October 23, 2014 for further assessment and plan  #8  I advised him to follow-up in one year or sooner, if needed          Health Management  Encounter for screening for malignant neoplasm of colon   COLONOSCOPY; every 10 years; Last 42FIL6333; Next Due: 84Vwj4466; Active    Future Appointments    Date/Time Provider Specialty Site   07/12/2017 04:00 PM DUONG Whalen  [facility]     Signatures   Electronically signed by :  Katie Espinoza MD; Jul 31 2016  2:15PM EST                       (Author)

## 2018-01-11 NOTE — MISCELLANEOUS
Message   Recorded as Task   Date: 02/03/2016 11:41 AM, Created By: Tyler Pollard   Task Name: Medical Complaint Callback   Assigned To: MEDICAL  [facility]   Regarding Patient: Po Dotson, Status: Active   Comment:    Tyler Pollard - 03 Feb 2016 11:41 AM     TASK CREATED  patient's wife Jm Burton calling on patient's behalf to ask if patient needs the pertussis vaccine because their grand daughter will be born on 3/10/16?    [email or web address] number   Yuliet Liner - 03 Feb 2016 12:53 PM     TASK REASSIGNED: Previously Assigned To Yuliet Liner  #1  Please call the patient or wife back to let them know that he should receive the Adacel vaccination which contains the pertusis vaccine  #2  Schedule him for the vaccine  Plan  Need for diphtheria-tetanus-pertussis (Tdap) vaccine    · Adacel 5-2-15 5 LF-MCG/0 5 Intramuscular Suspension; INJECT 0 5  ML  Intramuscular; To Be Done: 66WVV3761    Signatures   Electronically signed by :  Manju Taveras MD; Feb  3 2016 12:54PM EST                       (Author)

## 2018-01-12 NOTE — RESULT NOTES
Verified Results  (1) CBC/PLT/DIFF 98BEI8873 07:44AM [facility] Order Number: [id number]     Test Name Result Flag Reference   WBC COUNT 3 65 Thousand/uL L 4 31-10 16   RBC COUNT 4 34 Million/uL  3 88-5 62   HEMOGLOBIN 15 0 g/dL  12 0-17 0   HEMATOCRIT 41 8 %  36 5-49 3   MCV 96 fL  82-98   MCH 34 6 pg H 26 8-34 3   MCHC 35 9 g/dL  31 4-37 4   RDW 12 4 %  11 6-15 1   MPV 9 8 fL  8 9-12 7   PLATELET COUNT 299 Thousands/uL  149-390   nRBC AUTOMATED 0 /100 WBCs     NEUTROPHILS RELATIVE PERCENT 52 %  43-75   LYMPHOCYTES RELATIVE PERCENT 32 %  14-44   MONOCYTES RELATIVE PERCENT 11 %  4-12   EOSINOPHILS RELATIVE PERCENT 4 %  0-6   BASOPHILS RELATIVE PERCENT 1 %  0-1   NEUTROPHILS ABSOLUTE COUNT 1 91 Thousands/? ??L  1 85-7 62   LYMPHOCYTES ABSOLUTE COUNT 1 16 Thousands/? ??L  0 60-4 47   MONOCYTES ABSOLUTE COUNT 0 40 Thousand/? ??L  0 17-1 22   EOSINOPHILS ABSOLUTE COUNT 0 13 Thousand/? ??L  0 00-0 61   BASOPHILS ABSOLUTE COUNT 0 04 Thousands/? ??L  0 00-0 10     (1) COMPREHENSIVE METABOLIC PANEL 61VIH4005 32:94KA [facility] Order Number: [id number]     Test Name Result Flag Reference   SODIUM 141 mmol/L  136-145   POTASSIUM 3 8 mmol/L  3 5-5 3   CHLORIDE 108 mmol/L  100-108   CARBON DIOXIDE 24 mmol/L  21-32   ANION GAP (CALC) 9 mmol/L  4-13   BLOOD UREA NITROGEN 12 mg/dL  5-25   CREATININE 0 59 mg/dL L 0 60-1 30   Standardized to IDMS reference method   CALCIUM 9 1 mg/dL  8 3-10 1   BILI, TOTAL 0 65 mg/dL  0 20-1 00   ALK PHOSPHATAS 58 U/L     ALT (SGPT) 32 U/L  12-78   Specimen collection should occur prior to Sulfasalazine and/or Sulfapyridine administration due to the potential for falsely depressed results  AST(SGOT) 28 U/L  5-45   Specimen collection should occur prior to Sulfasalazine administration due to the potential for falsely depressed results     ALBUMIN 4 1 g/dL  3 5-5 0   TOTAL PROTEIN 7 4 g/dL  6 4-8 2   eGFR 115 ml/min/1 73sq m     National Kidney Disease Education Program recommendations are as follows:  GFR calculation is accurate only with a steady state creatinine  Chronic Kidney disease less than 60 ml/min/1 73 sq  meters  Kidney failure less than 15 ml/min/1 73 sq  meters  GLUCOSE FASTING 83 mg/dL  65-99   Specimen collection should occur prior to Sulfasalazine administration due to the potential for falsely depressed results  Specimen collection should occur prior to Sulfapyridine administration due to the potential for falsely elevated results  (1) LIPID PANEL FASTING W DIRECT LDL REFLEX [id number] 07:44AM [employer] Order Number: [id number]     Test Name Result Flag Reference   CHOLESTEROL 205 mg/dL H    LDL CHOLESTEROL CALCULATED 100 mg/dL  0-100   Triglyceride:        Normal <150 mg/dl   Borderline High 150-199 mg/dl   High 200-499 mg/dl   Very High >499 mg/dl      Cholesterol:       Desirable <200 mg/dl    Borderline High 200-239 mg/dl    High >239 mg/dl      HDL Cholesterol:       High>59 mg/dL    Low <41 mg/dL      HDL Cholesterol:       High>59 mg/dL    Low <41 mg/dL      This screening LDL is a calculated result  It does not have the accuracy of the Direct Measured LDL in the monitoring of patients with hyperlipidemia and/or statin therapy  Direct Measure LDL (REU308) must be ordered separately in these patients  TRIGLYCERIDES 158 mg/dL H <=150   Specimen collection should occur prior to N-Acetylcysteine or Metamizole administration due to the potential for falsely depressed results  HDL,DIRECT 73 mg/dL H 40-60   Specimen collection should occur prior to Metamizole administration due to the potential for falsley depressed results       (1) PSA (SCREEN) (Dx V76 44 Screen for Prostate Cancer) [id number] 07:44AM [employer] [facility] [employer] Order Number: [id number]     Test Name Result Flag Reference   PROSTATE SPECIFIC ANTIGEN 2 8 ng/mL  0 0-4 0   American Urological Association Guidelines define biochemical recurrence of prostate cancer as a detectable or rising PSA value post-radical prostatectomy that is greater than or equal to 0 2 ng/mL with a second confirmatory level of greater than or equal to 0 2 ng/mL  Discussion/Summary   Your white blood cell count is slightly low as it has been in the past  This is fine  Your triglyceride level is slightly elevated   The rest of the tests is normal      Signatures   Electronically signed by : DUONG Persaud ; Nov 5 2017  7:56AM EST                       (Author)

## 2018-01-13 VITALS
BODY MASS INDEX: 25.77 KG/M2 | WEIGHT: 174 LBS | SYSTOLIC BLOOD PRESSURE: 138 MMHG | DIASTOLIC BLOOD PRESSURE: 82 MMHG | HEIGHT: 69 IN | HEART RATE: 98 BPM | OXYGEN SATURATION: 97 %

## 2018-01-14 VITALS
RESPIRATION RATE: 16 BRPM | OXYGEN SATURATION: 97 % | DIASTOLIC BLOOD PRESSURE: 110 MMHG | HEIGHT: 69 IN | HEART RATE: 89 BPM | SYSTOLIC BLOOD PRESSURE: 164 MMHG | BODY MASS INDEX: 25.63 KG/M2 | WEIGHT: 173.03 LBS

## 2018-01-15 ENCOUNTER — TRANSCRIBE ORDERS (OUTPATIENT)
Dept: ADMINISTRATIVE | Facility: HOSPITAL | Age: 53
End: 2018-01-15

## 2018-01-15 ENCOUNTER — ALLSCRIPTS OFFICE VISIT (OUTPATIENT)
Dept: OTHER | Facility: OTHER | Age: 53
End: 2018-01-15

## 2018-01-15 DIAGNOSIS — M54.12 BRACHIAL NEURITIS: Primary | ICD-10-CM

## 2018-01-16 NOTE — PROGRESS NOTES
Assessment   1  Disorder of intervertebral disc of cervical spine (722 91) (M50 90)   · C5-6  Sees ortho  2  Cervical radiculopathy (723 4) (M54 12)    Plan   Cervical radiculopathy    · *2 - [address] [employer]  * referral for cervical spine    injections for cervical radiculopathy  Status: Active  Requested for: 16QLZ8377   Ordered; For: Cervical radiculopathy; Ordered By: Anshu Tan Performed:  Due: 35ENG7252  Care Summary provided  : Yes   · EMG, performed same day as NCS, Limited Study 4 or less muscles; Status:Need    Information - Financial Authorization; Requested LOX:[id number]; Perform:[facility]; Order Comments:Evaluate for carpal tunnel syndrome left upper extremity; Due:12Ocy8974; Last Updated By:Valerie Driver; 1/15/2018 4:23:43 PM;Ordered; For:Cervical radiculopathy; Ordered By:José Manuel Harley;   · Follow-up visit in 6 weeks Evaluation and Treatment  Follow-up  Status: Complete  Done:    78TAU8989   Ordered; For: Cervical radiculopathy; Ordered By: Anshu Tan Performed:  Due: 44KJT6705; Last Updated By: Skyla Walker; 1/15/2018 5:17:11 PM    Discussion/Summary      61-year-old male with likely left cervical radiculopathy  We will obtain EMG and nerve conduction study to left upper extremity to evaluate for concomitant carpal tunnel syndrome  We will also refer him to spine and pain for cervical spine injection  Plan is as follows:   as tolerated bilateral upper extremities to spine pain for spinal injection to evaluate for concomitant carpal tunnel syndrome in 6 weeks to review the results  Chief Complaint   1  Neck Pain    History of Present Illness   HPI: 61-year-old male with a 6-8 month history of left upper extremity numbness and paresthesias that radiate from the shoulder distally to the middle 3 fingers  This is especially apparent in the morning after his awoken from sleep  He has not had physical therapy or steroid injections  Has not tried oral corticosteroids  Review of Systems        Constitutional: No fever or chills, feels well, no tiredness, no recent weight loss or weight gain  Eyes: No complaints of red eyes, no eyesight problems  ENT: no complaints of loss of hearing, no nosebleeds, no sore throat  Cardiovascular: No complaints of chest pain, no palpitations, no leg claudication or lower extremity edema  Respiratory: No complaints of shortness of breath, no wheezing, no cough  Gastrointestinal: No complaints of abdominal pain, no constipation, no nausea or vomiting, no diarrhea or bloody stools  Genitourinary: No complaints of dysuria or incontinence, no hesitancy, no nocturia  Musculoskeletal: as noted in HPI  Integumentary: as noted in HPI  Neurological: as noted in HPI  Psychiatric: No suicidal thoughts, no anxiety, no depression  Endocrine: No muscle weakness, no frequent urination, no excessive thirst, no feelings of weakness  ROS reviewed  Active Problems   1  Abnormal LFTs (liver function tests) (790 6) (R79 89)   2  Allergic rhinitis (477 9) (J30 9)   3  Anxiety disorder (300 00) (F41 9)   4  Brain mass (348 9) (G93 9)   5  Carpal tunnel syndrome, unspecified laterality (354 0) (G56 00)   6  Cervical radiculopathy (723 4) (M54 12)   7  Diplopia (368 2) (H53 2)   8  Disorder of intervertebral disc of cervical spine (722 91) (M50 90)   9  Encounter for screening for malignant neoplasm of colon (V76 51) (Z12 11)   10  Fatigue (780 79) (R53 83)   11  Gastroesophageal reflux disease (530 81) (K21 9)   12  Laboratory examination ordered as part of a routine general medical examination      (V72 62) (Z00 00)   13  Long term use of drug (V58 69) (Z79 899)   14  Need for diphtheria-tetanus-pertussis (Tdap) vaccine (V06 1) (Z23)   15  Need for prophylactic vaccination against single diseases (V05 9) (Z23)   16  Rosacea (695 3) (L71 9)   17  Snoring (786 09) (R06 83)   18   Special screening examination for neoplasm of prostate (V76 44) (Z12 5)   19  Superior oblique palsy, 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)    Past Medical History    · History of Limb pain (729 5) (M79 609)   · History of Plantar fasciitis (728 71) (M72 2)     The active problems and past medical history were reviewed and updated today  Surgical History    · History of Diagnostic Esophagogastroduodenoscopy   · History of Shoulder Surgery Right   · History of Surgery Vas Deferens Vasectomy   · History of Tonsillectomy With Adenoidectomy     The surgical history was reviewed and updated today  Family History   Mother    · Family history of Family Health Status Of Mother - Good   · Family history of hypertension (V17 49) (Z82 49)   · Family history of Mother's Year Of Birth  Father    · Family history of Diabetes Mellitus (V18 0)   · Family history of congestive heart failure (V17 49) (Z82 49)   · Family history of type 2 diabetes mellitus (V18 0) (Z83 3)   · Family history of Father's Year Of Birth   · Family history of Heart Valve Replacement   · Family history of S/P AVR (aortic valve replacement)  Brother    · Family history of Family Health Status Brother 1   · Family history of Family Health Status Brother 2  Maternal Grandfather    · Family history of diabetes mellitus (V18 0) (Z83 3)     The family history was reviewed and updated today  Social History    · Current non-drinker of alcohol (V49 89) (Z78 9)   · Exercising Regularly   ·    · Never a smoker   · No alcohol use   · No illicit drug use   · Non smoker / tobacco user (V49 89) (Z78 9)   · Occupation   · Metal fabrication   · Two children  The social history was reviewed and updated today  Current Meds    1  ALPRAZolam 0 25 MG Oral Tablet; TAKE  1 TABLET TWICE DAILY AS NEEDED; Therapy: 22Qpb0287 to (Evaluate:06Nov2017); Last Rx:08Aug2017 Ordered   2  Doxycycline Hyclate 20 MG Oral Tablet; TAKE 1 TABLET DAILY;      Therapy: (Sheralyn Fothergill) to Recorded   3  Fluticasone Propionate 50 MCG/ACT Nasal Suspension; Use 1 to 2 sprays to both     nostrils daily during allergy season  Avoid spraying the nasal septum; Therapy: 64Veg1687 to (Evaluate:89Xqd0289)  Requested for: 11ZEV0635; Last     Rx:83Uks9631 Ordered   4  Omeprazole 40 MG Oral Capsule Delayed Release; take 1 capsule daily in the morning; Therapy: 81ZUL1928 to (Alvaro Alvarado)  Requested for: 52Kog4278; Last     Rx:97Cdb3436 Ordered     The medication list was reviewed and updated today  Allergies   1  No Known Drug Allergies  2  Seasonal    Vitals    Recorded: 06JCK7801 03:23PM   Heart Rate 88   Systolic 579   Diastolic 90   Height 5 ft 9 in   Weight 177 lb    BMI Calculated 26 14   BSA Calculated 1 96     Physical Exam        Constitutional - General appearance: Normal       Musculoskeletal - Gait and station: Normal -- Muscle strength/tone: Normal -- Upper extremity compartments: Normal -- Lower extremity compartments: Normal       Cardiovascular - Pulses: Normal -- Examination of extremities for edema and/or varicosities: Normal -- Heart - RRR, no murmurs, no rubs, no gallops  Respiratory - Lungs - Clear to auscultation bilaterally, no rales, no rhonci, no wheezes  Lymphatic - Palpation of lymph nodes in other areas: Normal       Skin - Skin and subcutaneous tissue: Normal -- Palpation of skin and subcutaneous tissue: Normal       Neurologic - Reflexes: Normal -- Sensation: Normal -- Upper extremity peripheral neuro exam: Normal -- Lower extremity peripheral neuro exam: Normal       Psychiatric - Orientation to person, place, and time: Normal -- Mood and affect: Normal       Eyes      Conjunctiva and lids: Normal        Pupils and irises: Normal        Free Text - Cervical spine: Cervical spine range of motion is intact  Spurling test is negative  Motor is 5/5 intact C5 through T1  Sensation is intact C5 through T1  Negative Judson sign  Negative Durkan's test, negative Tinel's test       Results/Data   I personally reviewed the films/images/results in the office today  My interpretation follows  MRI Review MRI C-spine shows Moderate right foraminal narrowing at C2-3 secondary to worsening facet hypertrophic degenerative change  This has progressed since the prior exam  Mild C5-6 degenerative disc disease with mild canal stenosis and foraminal narrowing  C6-7 demonstrates moderate left foraminal narrowing secondary to mild uncinate joint hypertrophic change  This is new compared to the prior examination  Attending Note   Attending Note [facility]: Level of Participation: I was present in clinic and examined the patient  Patient's History: Patient presents for evaluation of neck pain that radiates down the left upper extremity  She reports pain numbness and tingling into his radial digits  He denies ashwini weakness  He does have history of right-sided carpal tunnel syndrome diagnosed over 10 years ago  He has not had a recent EMG of the upper extremities  Key Parts of the Exam: Awake alert and oriented x3  Affect is appropriate  Negative Spurling's bilaterally  5/5 strength C5 through T1 bilaterally  Sensation is grossly intact to light touch in the C5 through T1 distributions  Negative Durkan's compression test at the wrists bilaterally  Negative Tinel's at the wrist bilaterally  Cervical spine MRI demonstrates multilevel cervical DDD with areas of stenosis at C6-7 and to a lesser extent at C5-6  Diagnosis and Plan: Follow-up with pain management for a diagnosis of cervical radiculopathy in the setting of DDD and foraminal stenosis  An updated of the EMG nerve conduction velocity study of the upper extremities will be requested for  Future Appointments      Date/Time Provider Specialty Site   03/22/2018 03:15 PM DUONG Kauffman   [place] [address] [facility]     Signatures    Electronically signed by : Quan Andrews MD; Mahad 15 2018  4:17PM EST                       (Author)     Electronically signed by : DUONG Florez ; Mahad 15 2018 10:59PM EST                       (Author)

## 2018-01-22 VITALS
HEART RATE: 82 BPM | HEIGHT: 69 IN | OXYGEN SATURATION: 95 % | HEART RATE: 88 BPM | BODY MASS INDEX: 26.07 KG/M2 | DIASTOLIC BLOOD PRESSURE: 66 MMHG | RESPIRATION RATE: 18 BRPM | DIASTOLIC BLOOD PRESSURE: 90 MMHG | HEIGHT: 69 IN | WEIGHT: 176.04 LBS | WEIGHT: 177 LBS | SYSTOLIC BLOOD PRESSURE: 158 MMHG | SYSTOLIC BLOOD PRESSURE: 112 MMHG | BODY MASS INDEX: 26.22 KG/M2

## 2018-01-23 NOTE — RESULT NOTES
Verified Results  * MRI CERVICAL SPINE WO CONTRAST [id number] 03:50PM Jessiac Roper    Order Number: [id number]    - Patient Instructions: To schedule this appointment, please contact Central Scheduling at [phone]  Test Name Result Flag Reference   MRI CERVICAL SPINE WO CONTRAST (Report)     MRI CERVICAL SPINE WITHOUT CONTRAST     INDICATION: Right arm numbness  COMPARISON: 2/28/2012     TECHNIQUE: Sagittal T1, sagittal T2, sagittal inversion recovery, axial T2, axial 2D merge        IMAGE QUALITY: Diagnostic     FINDINGS:     ALIGNMENT: Normal alignment of the cervical spine  No compression fracture  No subluxation  No scoliosis  MARROW SIGNAL: Normal marrow signal is identified within the visualized bony structures  No discrete marrow lesion  CERVICAL AND VISUALIZED THORACIC CORD: Normal signal within the visualized cord  PREVERTEBRAL AND PARASPINAL SOFT TISSUES:  Normal          VISUALIZED POSTERIOR FOSSA: The visualized posterior fossa demonstrates no abnormal signal      CERVICAL DISC SPACES:        C2-C3: Normal disc height and signal  Worsening facet hypertrophic degenerative change on the right  No canal stenosis  Moderate right foraminal narrowing  C3-C4: Normal disc height and signal  No disc herniation  No canal stenosis or foraminal narrowing  C4-C5: Normal disc height and signal without disc herniation, canal stenosis or foraminal narrowing  C5-C6: Slight loss of disc height  Slight annular bulging with mild uncinate joint hypertrophic change  Mild canal stenosis and bilateral foraminal narrowing  C6-C7: Mild annular bulging  Mild left uncinate joint hypertrophic degenerative change  Mild canal stenosis  Moderate left foraminal narrowing  C7-T1: Normal      UPPER THORACIC DISC SPACES: Normal        IMPRESSION:     Moderate right foraminal narrowing at C2-3 secondary to worsening facet hypertrophic degenerative change   This has progressed since the prior exam      Mild C5-6 degenerative disc disease with mild canal stenosis and foraminal narrowing  C6-7 demonstrates moderate left foraminal narrowing secondary to mild uncinate joint hypertrophic change  This is new compared to the prior examination               Workstation performed: [id number]     Signed by:   Andreia Shay DO   12/13/17       Plan  Cervical radiculopathy    · 1 - Dolores LOERA, Ameena Dasilva  (Orthopedic Surgery) Co-Management  *  Status: Active   Requested for: 83RPV8653  Care Summary provided  : Yes

## 2018-01-23 NOTE — PROGRESS NOTES
lATE ENTRY  Patient's optometrist called that he was in to see them for vertical diplopia  Measurements worse from last year  He is very concerned and will be sending him to Dr Lanette Maria for imaging and blood work  I agreed  and preferred that testing be ordered by the ophthalmologist          Electronically signed Juani RIZVI    Dec  5 2017 11:38AM EST

## 2018-01-24 VITALS
SYSTOLIC BLOOD PRESSURE: 133 MMHG | TEMPERATURE: 98.8 F | BODY MASS INDEX: 26.22 KG/M2 | HEIGHT: 69 IN | DIASTOLIC BLOOD PRESSURE: 82 MMHG | RESPIRATION RATE: 20 BRPM | WEIGHT: 177 LBS | HEART RATE: 83 BPM

## 2018-02-09 RX ORDER — DOXYCYCLINE HYCLATE 20 MG
1 TABLET ORAL DAILY
COMMUNITY
End: 2018-05-24 | Stop reason: SDUPTHER

## 2018-02-12 ENCOUNTER — HOSPITAL ENCOUNTER (OUTPATIENT)
Dept: NEUROLOGY | Facility: CLINIC | Age: 53
Discharge: HOME | End: 2018-02-12
Payer: COMMERCIAL

## 2018-02-12 DIAGNOSIS — G56.02 CARPAL TUNNEL SYNDROME OF LEFT WRIST: ICD-10-CM

## 2018-02-12 DIAGNOSIS — M54.12 BRACHIAL NEURITIS: ICD-10-CM

## 2018-02-12 PROCEDURE — 95886 MUSC TEST DONE W/N TEST COMP: CPT | Performed by: PSYCHIATRY & NEUROLOGY

## 2018-02-12 PROCEDURE — 95910 NRV CNDJ TEST 7-8 STUDIES: CPT | Performed by: PSYCHIATRY & NEUROLOGY

## 2018-02-20 ENCOUNTER — TELEPHONE (OUTPATIENT)
Dept: INTERNAL MEDICINE CLINIC | Facility: CLINIC | Age: 53
End: 2018-02-20

## 2018-02-21 NOTE — TELEPHONE ENCOUNTER
Spoke with patient  and discussed EMG pointing to carpal tunnel on the left  He c/o pain in the left shoulder blade down to the left arm when he sits on a hard surface x at least 6months  I think this is related to his cervical spine which was imaged with an MRI in December  He can try a wrist brace for carpal tunnel on the left  He should f/u with Dr Jenni Angel to discuss his other options to treat the radiculopathy  We agreed that he will update me after his visit with him next week

## 2018-02-26 ENCOUNTER — OFFICE VISIT (OUTPATIENT)
Dept: OBGYN CLINIC | Facility: HOSPITAL | Age: 53
End: 2018-02-26
Payer: COMMERCIAL

## 2018-02-26 VITALS
WEIGHT: 178 LBS | HEIGHT: 68 IN | DIASTOLIC BLOOD PRESSURE: 90 MMHG | BODY MASS INDEX: 26.98 KG/M2 | SYSTOLIC BLOOD PRESSURE: 142 MMHG | HEART RATE: 97 BPM

## 2018-02-26 DIAGNOSIS — M54.12 RADICULOPATHY, CERVICAL REGION: Primary | ICD-10-CM

## 2018-02-26 DIAGNOSIS — G56.02 CARPAL TUNNEL SYNDROME ON LEFT: ICD-10-CM

## 2018-02-26 PROCEDURE — 99213 OFFICE O/P EST LOW 20 MIN: CPT | Performed by: ORTHOPAEDIC SURGERY

## 2018-02-26 NOTE — PROGRESS NOTES
Assessment/Plan:    Patient seen examined by Dr Jasmina Silver and myself  He has symptoms consistent with carpal tunnel on the left for which he was provided a cock-up splint today in the office  He is to wear this at night  His MRI also reveals cervical degenerative disc disease more prominent on the left side at C5-6 and C6-7 causing foraminal stenosis  [facility] around this region  Follow-up and four weeks for re-evaluation if any of this fails to provide relief we will inject the left median nerve with steroid and monitor for improvement  Patient Active Problem List   Diagnosis    Carpal tunnel syndrome on left   Left cervical radiculopathy    Subjective:      Patient ID: Samm Morris is a 45 y o  male  HPI    The patient is a 17-year-old male who presents for a follow-up appointment  He was last seen about a month and half ago for left upper extremity numbness and tingling that radiates from the shoulder to the middle three fingers  This occurs mostly when the patient wakes up in the morning and resolves throughout the day  This also happens when he is sitting  He denies any neck pain  He is here to review EMG study  The following portions of the patient's history were reviewed and updated as appropriate: allergies, current medications, past family history, past medical history, past social history, past surgical history and problem list     Review of Systems   Constitutional: Negative for chills, diaphoresis and fever  Respiratory: Negative for cough, choking and chest tightness  Cardiovascular: Negative for chest pain, palpitations and leg swelling  Gastrointestinal: Negative for abdominal distention, abdominal pain, anal bleeding and blood in stool  Musculoskeletal: Positive for arthralgias  Negative for neck pain  Skin: Negative for rash and wound  Neurological: Positive for numbness   Negative for weakness  Objective:      /90   Pulse 97   Ht 5' 8" (1 727 m)   Wt 80 7 kg (178 lb)   BMI 27 06 kg/m²          Physical Exam   Constitutional: He is oriented to person, place, and time  He appears well-developed and well-nourished  No distress  HENT:   Head: Normocephalic and atraumatic  Cardiovascular: Intact distal pulses  Pulmonary/Chest: Effort normal  No respiratory distress  He has no wheezes  Musculoskeletal: He exhibits no edema or tenderness  Neurological: He is alert and oriented to person, place, and time  Skin: Skin is warm and dry  He is not diaphoretic  Psychiatric: He has a normal mood and affect  Nursing note and vitals reviewed  No acute distress  Gait is normal  Inspection reveals no open wounds or erythema  Posterior cervical spine and surrounding paraspinal musculature is nontender to palpation  Negative Judson sign bilaterally  Strength is 5/5 C5 through T1 bilaterally, sensation is equal and intact negative Tinel sign negative Durkan sign positive radial pulses bilaterally  He has normal reflexes brachioradialis biceps and triceps bilaterally

## 2018-03-26 ENCOUNTER — TRANSCRIBE ORDERS (OUTPATIENT)
Dept: NEUROSURGERY | Facility: CLINIC | Age: 53
End: 2018-03-26

## 2018-03-26 DIAGNOSIS — G93.89 BRAIN MASS: Primary | ICD-10-CM

## 2018-04-02 ENCOUNTER — OFFICE VISIT (OUTPATIENT)
Dept: AUDIOLOGY | Age: 53
End: 2018-04-02
Payer: COMMERCIAL

## 2018-04-02 DIAGNOSIS — H90.3 SENSORINEURAL HEARING LOSS, BILATERAL: Primary | ICD-10-CM

## 2018-04-02 PROCEDURE — V5261 HEARING AID, DIGIT, BIN, BTE: HCPCS | Performed by: AUDIOLOGIST

## 2018-04-02 PROCEDURE — V5160 DISPENSING FEE BINAURAL: HCPCS | Performed by: AUDIOLOGIST

## 2018-04-02 PROCEDURE — 92593 HB HEARING AID CHECK BOTH EARS: CPT | Performed by: AUDIOLOGIST

## 2018-04-02 NOTE — PROGRESS NOTES
Hearing aid Fitting    Name:  Glenroy Berry  :  1965  Age:  45 y o  Date of Evaluation: 18       Glenroy Berry is being see today to be fit with new hearing aids  Patient is being fit with Oticon Opn 2 mini RITE  Right serial number [id number] / left serial number [id number]  Warranty date 21  The hearing aid(s) were adjusted based on the patient's most recent audiogram and patient comfort  Patient reported good sound quality  Care and cleaning of the hearing aids was reviewed  Domes (size 8 open), filters, and batteries were reviewed with the patient  The patient's battery size is 312  Insertion and removal of the hearing aids was done  The patient practiced insertion and removal of the devices in the office, they demonstrated excellent ability to manipulate the hearing aids  Telephone use was reviewed with the patient  The patient expressed satisfaction with the hearing aids  Recommendation:   Follow up in two weeks         Aruna Carpio , CCC-A  Clinical Audiologist

## 2018-04-03 ENCOUNTER — HOSPITAL ENCOUNTER (OUTPATIENT)
Dept: RADIOLOGY | Facility: HOSPITAL | Age: 53
Discharge: HOME | End: 2018-04-03
Attending: NEUROLOGICAL SURGERY
Payer: COMMERCIAL

## 2018-04-03 DIAGNOSIS — G93.89 BRAIN MASS: ICD-10-CM

## 2018-04-03 PROCEDURE — 70553 MRI BRAIN STEM W/O & W/DYE: CPT

## 2018-04-03 PROCEDURE — A9585 GADOBUTROL INJECTION: HCPCS | Performed by: NEUROLOGICAL SURGERY

## 2018-04-03 RX ADMIN — GADOBUTROL 10 ML: 604.72 INJECTION INTRAVENOUS at 15:35

## 2018-04-17 ENCOUNTER — OFFICE VISIT (OUTPATIENT)
Dept: AUDIOLOGY | Age: 53
End: 2018-04-17

## 2018-04-17 DIAGNOSIS — H90.3 SENSORINEURAL HEARING LOSS, BILATERAL: Primary | ICD-10-CM

## 2018-04-20 DIAGNOSIS — K21.9 GASTROESOPHAGEAL REFLUX DISEASE, ESOPHAGITIS PRESENCE NOT SPECIFIED: Primary | ICD-10-CM

## 2018-04-20 RX ORDER — OMEPRAZOLE 40 MG/1
40 CAPSULE, DELAYED RELEASE ORAL DAILY
Qty: 90 CAPSULE | Refills: 0 | Status: SHIPPED | OUTPATIENT
Start: 2018-04-20 | End: 2018-08-17 | Stop reason: SDUPTHER

## 2018-04-20 RX ORDER — OMEPRAZOLE 40 MG/1
CAPSULE, DELAYED RELEASE ORAL
COMMUNITY
Start: 2018-03-21 | End: 2018-04-20 | Stop reason: SDUPTHER

## 2018-05-03 ENCOUNTER — TELEPHONE (OUTPATIENT)
Dept: NEUROSURGERY | Facility: CLINIC | Age: 53
End: 2018-05-03

## 2018-05-24 DIAGNOSIS — L71.9 ROSACEA: Primary | ICD-10-CM

## 2018-05-24 DIAGNOSIS — L71.9 ROSACEA: ICD-10-CM

## 2018-05-24 RX ORDER — DOXYCYCLINE HYCLATE 20 MG
TABLET ORAL
Qty: 90 TABLET | Refills: 1 | Status: SHIPPED | OUTPATIENT
Start: 2018-05-24 | End: 2018-08-13 | Stop reason: SDUPTHER

## 2018-05-24 RX ORDER — DOXYCYCLINE HYCLATE 20 MG
20 TABLET ORAL DAILY
Qty: 15 TABLET | Refills: 0 | Status: SHIPPED | OUTPATIENT
Start: 2018-05-24 | End: 2018-05-24 | Stop reason: SDUPTHER

## 2018-06-11 DIAGNOSIS — L71.9 ROSACEA: ICD-10-CM

## 2018-06-11 RX ORDER — DOXYCYCLINE HYCLATE 20 MG
20 TABLET ORAL DAILY
Qty: 15 TABLET | Refills: 0 | Status: SHIPPED | OUTPATIENT
Start: 2018-06-11 | End: 2018-06-26

## 2018-08-13 DIAGNOSIS — L71.9 ROSACEA: ICD-10-CM

## 2018-08-15 DIAGNOSIS — L71.9 ROSACEA: ICD-10-CM

## 2018-08-15 RX ORDER — DOXYCYCLINE HYCLATE 20 MG
TABLET ORAL
Qty: 90 TABLET | Refills: 1 | Status: SHIPPED | OUTPATIENT
Start: 2018-08-15 | End: 2018-12-27 | Stop reason: SDUPTHER

## 2018-08-15 RX ORDER — DOXYCYCLINE HYCLATE 20 MG
TABLET ORAL
Qty: 14 TABLET | Refills: 0 | Status: SHIPPED | OUTPATIENT
Start: 2018-08-15 | End: 2018-08-15 | Stop reason: SDUPTHER

## 2018-08-17 DIAGNOSIS — K21.9 GASTROESOPHAGEAL REFLUX DISEASE, ESOPHAGITIS PRESENCE NOT SPECIFIED: ICD-10-CM

## 2018-08-17 RX ORDER — OMEPRAZOLE 40 MG/1
CAPSULE, DELAYED RELEASE ORAL
Qty: 90 CAPSULE | Refills: 0 | Status: SHIPPED | OUTPATIENT
Start: 2018-08-17 | End: 2018-11-15 | Stop reason: SDUPTHER

## 2018-08-31 ENCOUNTER — OFFICE VISIT (OUTPATIENT)
Dept: AUDIOLOGY | Age: 53
End: 2018-08-31

## 2018-08-31 DIAGNOSIS — H90.3 SENSORINEURAL HEARING LOSS, BILATERAL: Primary | ICD-10-CM

## 2018-08-31 NOTE — PROGRESS NOTES
Hearing Aid Visit:    Name:  Mena Vegas  :  1965  Age:  52 y o  Date of Evaluation: 18     Mena Vegas is being seen for a hearing aid visit  Mena Vegas   is fit binaurally with Oticon Opn 2 miniRITE hearing aid(s) serial number [id number] right/ serial number [id number] left  Warranty expires 21  Patient reports he got an iPhone but does not know how to connect his hearing aids to it  Action:  Talked patient through connecting hearing aids to iPhone  Provided him written instructions on pairing in case it disconnects, and ON kirit  Attempted to place a call to patient's phone but could not get service in the building  Recommendations:   Return KALLIE So, Audiology Intern  Aruna Roth , CCC-A  Clinical Audiologist

## 2018-10-02 ENCOUNTER — IMMUNIZATION (OUTPATIENT)
Dept: INTERNAL MEDICINE CLINIC | Facility: CLINIC | Age: 53
End: 2018-10-02
Payer: COMMERCIAL

## 2018-10-02 DIAGNOSIS — Z23 NEED FOR INFLUENZA VACCINATION: Primary | ICD-10-CM

## 2018-10-02 PROCEDURE — 90682 RIV4 VACC RECOMBINANT DNA IM: CPT

## 2018-10-02 PROCEDURE — 90471 IMMUNIZATION ADMIN: CPT

## 2018-10-17 ENCOUNTER — OFFICE VISIT (OUTPATIENT)
Dept: INTERNAL MEDICINE CLINIC | Facility: CLINIC | Age: 53
End: 2018-10-17
Payer: COMMERCIAL

## 2018-10-17 VITALS
SYSTOLIC BLOOD PRESSURE: 136 MMHG | DIASTOLIC BLOOD PRESSURE: 78 MMHG | RESPIRATION RATE: 16 BRPM | HEART RATE: 89 BPM | OXYGEN SATURATION: 96 % | BODY MASS INDEX: 26.52 KG/M2 | WEIGHT: 175 LBS | HEIGHT: 68 IN

## 2018-10-17 DIAGNOSIS — Z00.00 ENCOUNTER FOR WELLNESS EXAMINATION: Primary | ICD-10-CM

## 2018-10-17 DIAGNOSIS — Z12.5 SCREENING FOR PROSTATE CANCER: ICD-10-CM

## 2018-10-17 DIAGNOSIS — Z13.220 SCREENING, LIPID: ICD-10-CM

## 2018-10-17 DIAGNOSIS — Z13.0 SCREENING FOR DEFICIENCY ANEMIA: ICD-10-CM

## 2018-10-17 PROCEDURE — 99396 PREV VISIT EST AGE 40-64: CPT | Performed by: NURSE PRACTITIONER

## 2018-10-17 NOTE — PROGRESS NOTES
NAME: Joanne Cardoza  AGE: 52 y o  SEX: male  : 1965     DATE: 10/17/2018    Assessment and Plan     Problem List Items Addressed This Visit     None      Visit Diagnoses     Encounter for wellness examination    -  Primary    Relevant Orders    Comprehensive metabolic panel    Screening for deficiency anemia        Relevant Orders    CBC and differential    Screening, lipid        Relevant Orders    Lipid Panel with Direct LDL reflex    Screening for prostate cancer        Relevant Orders    PSA Total, Diagnostic            · Patient Counseling:   · Nutrition: Stressed importance of a well balanced diet, moderation of sodium/saturated fat, caloric balance and sufficient intake of fiber  · Exercise: Stressed the importance of regular exercise with a goal of 150 minutes per week  · Dental Health: Discussed daily flossing and brushing and regular dental visits     · Immunizations reviewed yes  · Discussed benefits of screening yes  · Discussed the patient's BMI with him    The BMI is in the acceptable range     Return in about 1 year (around 10/17/2019) for Annual physical         Chief Complaint     Chief Complaint   Patient presents with    Physical Exam       History of Present Illness     HPI    Well Adult Physical   Patient here for a comprehensive physical exam       Diet and Physical Activity  Diet: well balanced diet  Weight concerns: None, patient's BMI is between 18 5-24 9  Exercise: frequently      Depression Screen  PHQ-9 Depression Screening    PHQ-9:    Frequency of the following problems over the past two weeks:       Little interest or pleasure in doing things:  0 - not at all  Feeling down, depressed, or hopeless:  0 - not at all  PHQ-2 Score:  0          General Health  Hearing: Hearing aid use: bilateral  Vision: most recent eye exam <1 year and wears glasses  Dental: regular dental visits        The following portions of the patient's history were reviewed and updated as appropriate: allergies, current medications, past family history, past medical history, past social history, past surgical history and problem list     Review of Systems     Review of Systems   Constitutional: Negative  HENT: Negative  Eyes: Negative  Respiratory: Negative  Cardiovascular: Negative  Gastrointestinal: Negative  Genitourinary: Negative  Musculoskeletal: Negative  Skin: Negative  Neurological: Negative  Psychiatric/Behavioral: Negative  Past Medical History     No past medical history on file      Past Surgical History     Past Surgical History:   Procedure Laterality Date    ESOPHAGOGASTRODUODENOSCOPY  02/08/2013    DIAGNOSTIC    SHOULDER SURGERY Right     [id number] LAST ASSESSED    TONSILLECTOMY AND ADENOIDECTOMY      [id number] LAST ASSESSED    VASECTOMY      [id number] LAST ASSESSED       Social History     Social History     Social History    Marital status: /Civil Union     Spouse name: N/A    Number of children: 2    Years of education: N/A     Occupational History    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      Social History Main Topics    Smoking status: Never Smoker    Smokeless tobacco: Never Used    Alcohol use No    Drug use: No    Sexual activity: Not on file     Other Topics Concern    Not on file     Social History Narrative    EXERCISING REGULARLY           Family History     Family History   Problem Relation Age of Onset    Hypertension Mother     Diabetes Father         MELLITUS    Heart failure Father         CONGESTIVE    Diabetes type II Father     Heart disease Father         HEART VALVE REPLACEMENT    Diabetes Maternal Grandfather         MELLITUS       Current Medications       Current Outpatient Prescriptions:     doxycycline (PERIOSTAT) 20 MG tablet, 1 tab PO daily, Disp: 90 tablet, Rfl: 1    omeprazole (PriLOSEC) 40 MG capsule, TAKE 1 CAPSULE DAILY, Disp: 90 capsule, Rfl: 0     Allergies     No Known Allergies    Objective     /78   Pulse 89   Resp 16   Ht 5' 8" (1 727 m)   Wt 79 4 kg (175 lb)   SpO2 96%   BMI 26 61 kg/m²      Physical Exam   Constitutional: He is oriented to person, place, and time  He appears well-developed and well-nourished  HENT:   Right Ear: External ear normal    Left Ear: External ear normal    Eyes: Pupils are equal, round, and reactive to light  Neck: No thyromegaly present  Cardiovascular: Normal rate, regular rhythm, normal heart sounds and intact distal pulses  Pulmonary/Chest: Effort normal and breath sounds normal    Abdominal: Soft  Bowel sounds are normal    Musculoskeletal: He exhibits no edema  Lymphadenopathy:     He has no cervical adenopathy  Neurological: He is alert and oriented to person, place, and time  Psychiatric: He has a normal mood and affect  His behavior is normal    Vitals reviewed          Health Maintenance     Health Maintenance   Topic Date Due    CRC Screening: Colonoscopy  1965    Depression Screening PHQ  10/17/2019    DTaP,Tdap,and Td Vaccines (2 - Td) 02/10/2026    INFLUENZA VACCINE  Completed     Immunization History   Administered Date(s) Administered    Influenza, recombinant, quadrivalent,injectable, preservative free 10/02/2018    Tdap 02/10/2016       AMPARO Vargas  [facility]

## 2018-10-19 ENCOUNTER — APPOINTMENT (OUTPATIENT)
Dept: LAB | Age: 53
End: 2018-10-19
Payer: COMMERCIAL

## 2018-10-19 DIAGNOSIS — Z00.00 ENCOUNTER FOR WELLNESS EXAMINATION: ICD-10-CM

## 2018-10-19 DIAGNOSIS — Z12.5 SCREENING FOR PROSTATE CANCER: ICD-10-CM

## 2018-10-19 DIAGNOSIS — Z13.0 SCREENING FOR DEFICIENCY ANEMIA: ICD-10-CM

## 2018-10-19 DIAGNOSIS — Z13.220 SCREENING, LIPID: ICD-10-CM

## 2018-10-19 LAB
ALBUMIN SERPL BCP-MCNC: 4.2 G/DL (ref 3.5–5)
ALP SERPL-CCNC: 64 U/L (ref 46–116)
ALT SERPL W P-5'-P-CCNC: 62 U/L (ref 12–78)
ANION GAP SERPL CALCULATED.3IONS-SCNC: 8 MMOL/L (ref 4–13)
AST SERPL W P-5'-P-CCNC: 61 U/L (ref 5–45)
BASOPHILS # BLD AUTO: 0.05 THOUSANDS/ΜL (ref 0–0.1)
BASOPHILS NFR BLD AUTO: 1 % (ref 0–1)
BILIRUB SERPL-MCNC: 0.83 MG/DL (ref 0.2–1)
BUN SERPL-MCNC: 15 MG/DL (ref 5–25)
CALCIUM SERPL-MCNC: 8.8 MG/DL (ref 8.3–10.1)
CHLORIDE SERPL-SCNC: 106 MMOL/L (ref 100–108)
CHOLEST SERPL-MCNC: 190 MG/DL (ref 50–200)
CO2 SERPL-SCNC: 24 MMOL/L (ref 21–32)
CREAT SERPL-MCNC: 0.71 MG/DL (ref 0.6–1.3)
EOSINOPHIL # BLD AUTO: 0.06 THOUSAND/ΜL (ref 0–0.61)
EOSINOPHIL NFR BLD AUTO: 2 % (ref 0–6)
ERYTHROCYTE [DISTWIDTH] IN BLOOD BY AUTOMATED COUNT: 12 % (ref 11.6–15.1)
GFR SERPL CREATININE-BSD FRML MDRD: 122 ML/MIN/1.73SQ M
GLUCOSE P FAST SERPL-MCNC: 82 MG/DL (ref 65–99)
HCT VFR BLD AUTO: 44.4 % (ref 36.5–49.3)
HDLC SERPL-MCNC: 77 MG/DL (ref 40–60)
HGB BLD-MCNC: 15.4 G/DL (ref 12–17)
IMM GRANULOCYTES # BLD AUTO: 0.01 THOUSAND/UL (ref 0–0.2)
IMM GRANULOCYTES NFR BLD AUTO: 0 % (ref 0–2)
LDLC SERPL CALC-MCNC: 84 MG/DL (ref 0–100)
LYMPHOCYTES # BLD AUTO: 1.18 THOUSANDS/ΜL (ref 0.6–4.47)
LYMPHOCYTES NFR BLD AUTO: 29 % (ref 14–44)
MCH RBC QN AUTO: 34.3 PG (ref 26.8–34.3)
MCHC RBC AUTO-ENTMCNC: 34.7 G/DL (ref 31.4–37.4)
MCV RBC AUTO: 99 FL (ref 82–98)
MONOCYTES # BLD AUTO: 0.34 THOUSAND/ΜL (ref 0.17–1.22)
MONOCYTES NFR BLD AUTO: 8 % (ref 4–12)
NEUTROPHILS # BLD AUTO: 2.44 THOUSANDS/ΜL (ref 1.85–7.62)
NEUTS SEG NFR BLD AUTO: 60 % (ref 43–75)
NRBC BLD AUTO-RTO: 0 /100 WBCS
PLATELET # BLD AUTO: 240 THOUSANDS/UL (ref 149–390)
PMV BLD AUTO: 9.5 FL (ref 8.9–12.7)
POTASSIUM SERPL-SCNC: 4 MMOL/L (ref 3.5–5.3)
PROT SERPL-MCNC: 7.3 G/DL (ref 6.4–8.2)
PSA SERPL-MCNC: 3.4 NG/ML (ref 0–4)
RBC # BLD AUTO: 4.49 MILLION/UL (ref 3.88–5.62)
SODIUM SERPL-SCNC: 138 MMOL/L (ref 136–145)
TRIGL SERPL-MCNC: 146 MG/DL
WBC # BLD AUTO: 4.08 THOUSAND/UL (ref 4.31–10.16)

## 2018-10-19 PROCEDURE — 80061 LIPID PANEL: CPT

## 2018-10-19 PROCEDURE — 36415 COLL VENOUS BLD VENIPUNCTURE: CPT

## 2018-10-19 PROCEDURE — 80053 COMPREHEN METABOLIC PANEL: CPT

## 2018-10-19 PROCEDURE — 84153 ASSAY OF PSA TOTAL: CPT

## 2018-10-19 PROCEDURE — 85025 COMPLETE CBC W/AUTO DIFF WBC: CPT

## 2018-11-15 DIAGNOSIS — K21.9 GASTROESOPHAGEAL REFLUX DISEASE, ESOPHAGITIS PRESENCE NOT SPECIFIED: ICD-10-CM

## 2018-11-15 RX ORDER — OMEPRAZOLE 40 MG/1
CAPSULE, DELAYED RELEASE ORAL
Qty: 90 CAPSULE | Refills: 0 | Status: SHIPPED | OUTPATIENT
Start: 2018-11-15 | End: 2019-02-13 | Stop reason: SDUPTHER

## 2018-12-27 DIAGNOSIS — L71.9 ROSACEA: ICD-10-CM

## 2018-12-28 RX ORDER — DOXYCYCLINE HYCLATE 20 MG
TABLET ORAL
Qty: 90 TABLET | Refills: 0 | Status: SHIPPED | OUTPATIENT
Start: 2018-12-28 | End: 2019-03-25 | Stop reason: SDUPTHER

## 2019-01-11 ENCOUNTER — OFFICE VISIT (OUTPATIENT)
Dept: INTERNAL MEDICINE CLINIC | Facility: CLINIC | Age: 54
End: 2019-01-11
Payer: COMMERCIAL

## 2019-01-11 VITALS
HEART RATE: 80 BPM | SYSTOLIC BLOOD PRESSURE: 122 MMHG | OXYGEN SATURATION: 96 % | BODY MASS INDEX: 26.95 KG/M2 | WEIGHT: 177.8 LBS | HEIGHT: 68 IN | DIASTOLIC BLOOD PRESSURE: 64 MMHG

## 2019-01-11 DIAGNOSIS — F41.9 ANXIETY: ICD-10-CM

## 2019-01-11 DIAGNOSIS — L71.9 ROSACEA: Primary | ICD-10-CM

## 2019-01-11 PROCEDURE — 99213 OFFICE O/P EST LOW 20 MIN: CPT | Performed by: INTERNAL MEDICINE

## 2019-01-11 PROCEDURE — 3008F BODY MASS INDEX DOCD: CPT | Performed by: INTERNAL MEDICINE

## 2019-01-11 RX ORDER — ALPRAZOLAM 0.25 MG/1
0.25 TABLET ORAL 2 TIMES DAILY PRN
Qty: 60 TABLET | Refills: 0 | Status: SHIPPED | OUTPATIENT
Start: 2019-01-11 | End: 2019-12-02 | Stop reason: SDUPTHER

## 2019-01-11 NOTE — PROGRESS NOTES
Assessment/Plan: He would prefer waiting to see derm to treat the rosacea  [place] PDMP checked and is appropriate     Problem List Items Addressed This Visit     None      Visit Diagnoses     Anxiety    -  Primary    Relevant Medications    ALPRAZolam (XANAX) 0 25 mg tablet    Rosacea                Subjective:      Patient ID: Alvaro No is a 52 y o  male  HPI   Long h/o rosacea on doxycycline  Missed appt with derm a few months ago but just found out he had one for next week after he already made the appt here  He will see Jade Paez at Dr [facility] office  Rosacea has recently flared on the forehead, hairline, cheeks  Denies recent changes in diet, meds  Needs refill of Xanax which is taken prn for anxiety when he goes out in a crowd      The following portions of the patient's history were reviewed and updated as appropriate: allergies, current medications, past medical history, past social history, past surgical history and problem list     Review of Systems   Constitutional: Negative for fatigue, fever and unexpected weight change  HENT: Negative for sinus pain, sinus pressure and sore throat  Respiratory: Negative for cough, shortness of breath and wheezing  Cardiovascular: Negative for chest pain, palpitations and leg swelling  Gastrointestinal: Negative for abdominal pain, constipation, diarrhea, nausea and vomiting  Musculoskeletal: Negative for arthralgias and myalgias  Skin: Positive for rash (face)  Psychiatric/Behavioral: The patient is nervous/anxious  Objective:      /64   Pulse 80   Ht 5' 8" (1 727 m)   Wt 80 6 kg (177 lb 12 8 oz)   SpO2 96%   BMI 27 03 kg/m²          Physical Exam   Constitutional: He is oriented to person, place, and time  He appears well-developed and well-nourished  HENT:   Head: Normocephalic and atraumatic  Eyes: Conjunctivae are normal    Cardiovascular: Normal rate, regular rhythm and normal heart sounds      No murmur heard   Pulmonary/Chest: Effort normal and breath sounds normal  No respiratory distress  He has no wheezes  He has no rales  Abdominal: Soft  Bowel sounds are normal  He exhibits no distension and no mass  There is no tenderness  There is no rebound and no guarding  Neurological: He is alert and oriented to person, place, and time  Skin: Skin is warm and dry  Erythema on the cheeks, nose; papules on the forehead more on the left side   Psychiatric: He has a normal mood and affect   His behavior is normal  Judgment and thought content normal

## 2019-02-13 DIAGNOSIS — K21.9 GASTROESOPHAGEAL REFLUX DISEASE, ESOPHAGITIS PRESENCE NOT SPECIFIED: ICD-10-CM

## 2019-02-13 RX ORDER — OMEPRAZOLE 40 MG/1
40 CAPSULE, DELAYED RELEASE ORAL DAILY
Qty: 90 CAPSULE | Refills: 2 | Status: SHIPPED | OUTPATIENT
Start: 2019-02-13 | End: 2019-11-03 | Stop reason: SDUPTHER

## 2019-03-25 DIAGNOSIS — L71.9 ROSACEA: ICD-10-CM

## 2019-03-25 RX ORDER — DOXYCYCLINE HYCLATE 20 MG
TABLET ORAL
Qty: 90 TABLET | Refills: 0 | Status: SHIPPED | OUTPATIENT
Start: 2019-03-25 | End: 2019-08-14 | Stop reason: SDUPTHER

## 2019-08-14 DIAGNOSIS — L71.9 ROSACEA: ICD-10-CM

## 2019-08-14 RX ORDER — DOXYCYCLINE HYCLATE 20 MG
TABLET ORAL
Qty: 90 TABLET | Refills: 0 | Status: SHIPPED | OUTPATIENT
Start: 2019-08-14 | End: 2019-10-25 | Stop reason: SDUPTHER

## 2019-10-08 ENCOUNTER — TELEPHONE (OUTPATIENT)
Dept: OTHER | Facility: OTHER | Age: 54
End: 2019-10-08

## 2019-10-25 ENCOUNTER — OFFICE VISIT (OUTPATIENT)
Dept: INTERNAL MEDICINE CLINIC | Facility: CLINIC | Age: 54
End: 2019-10-25
Payer: COMMERCIAL

## 2019-10-25 VITALS
HEIGHT: 68 IN | SYSTOLIC BLOOD PRESSURE: 132 MMHG | HEART RATE: 85 BPM | BODY MASS INDEX: 26.8 KG/M2 | OXYGEN SATURATION: 96 % | RESPIRATION RATE: 16 BRPM | TEMPERATURE: 99 F | WEIGHT: 176.8 LBS | DIASTOLIC BLOOD PRESSURE: 84 MMHG

## 2019-10-25 DIAGNOSIS — Z12.5 SCREENING PSA (PROSTATE SPECIFIC ANTIGEN): ICD-10-CM

## 2019-10-25 DIAGNOSIS — Z23 NEED FOR INFLUENZA VACCINATION: ICD-10-CM

## 2019-10-25 DIAGNOSIS — Z00.00 HEALTH MAINTENANCE EXAMINATION: Primary | ICD-10-CM

## 2019-10-25 DIAGNOSIS — E55.9 VITAMIN D DEFICIENCY: ICD-10-CM

## 2019-10-25 DIAGNOSIS — L71.9 ROSACEA: ICD-10-CM

## 2019-10-25 PROCEDURE — 90682 RIV4 VACC RECOMBINANT DNA IM: CPT

## 2019-10-25 PROCEDURE — 99396 PREV VISIT EST AGE 40-64: CPT | Performed by: NURSE PRACTITIONER

## 2019-10-25 PROCEDURE — 90471 IMMUNIZATION ADMIN: CPT

## 2019-10-25 RX ORDER — DOXYCYCLINE HYCLATE 100 MG
TABLET ORAL
Qty: 90 TABLET | Refills: 0 | Status: SHIPPED | OUTPATIENT
Start: 2019-10-25 | End: 2020-08-01

## 2019-10-25 NOTE — PROGRESS NOTES
Ronald    NAME: Daily Bernal  AGE: 62 y o  SEX: male  : 1965     DATE: 10/28/2019     Assessment and Plan:     Problem List Items Addressed This Visit     None      Visit Diagnoses     Health maintenance examination    -  Primary    Relevant Orders    Comprehensive metabolic panel    Lipid panel    CBC and differential    Need for influenza vaccination        Relevant Orders    influenza vaccine, [id number], quadrivalent, recombinant, PF, 0 5 mL, for patients 18 yr+ (FLUBLOK) (Completed)    Rosacea        Relevant Medications    doxycycline (VIBRA-TABS) 100 mg tablet    Screening PSA (prostate specific antigen)        Relevant Orders    PSA, total and free    Vitamin D deficiency        Relevant Orders    Vitamin D 25 hydroxy          Immunizations and preventive care screenings were discussed with patient today  Appropriate education was printed on patient's after visit summary  Counseling:  · Exercise: the importance of regular exercise/physical activity was discussed  Recommend exercise 3-5 times per week for at least 30 minutes  BMI Counseling: Body mass index is 27 27 kg/m²  The BMI is above normal  Nutrition recommendations include decreasing fast food intake, consuming healthier snacks, limiting drinks that contain sugar and moderation in carbohydrate intake  Exercise recommendations include exercising 3-5 times per week  No pharmacotherapy was ordered  Patient referred to PCP due to patient being overweight  Return in about 1 year (around 10/25/2020) for Annual physical      Chief Complaint:     Chief Complaint   Patient presents with    Annual Exam      History of Present Illness:     Adult Annual Physical   Patient here for a comprehensive physical exam  The patient reports no problems  Diet and Physical Activity  · Diet/Nutrition: well balanced diet  · Exercise: walking        Depression Screening  PHQ-9 Depression Screening    PHQ-9:    Frequency of the following problems over the past two weeks:       Little interest or pleasure in doing things:  0 - not at all  Feeling down, depressed, or hopeless:  0 - not at all  PHQ-2 Score:  0       General Health  · Sleep: sleeps well and sleeps poorly  · Hearing: normal - bilateral   · Vision: no vision problems  · Dental: regular dental visits   Health  · Symptoms include: none     Review of Systems:     Review of Systems   Constitutional: Negative  HENT: Negative  Eyes: Negative  Respiratory: Negative  Cardiovascular: Negative  Gastrointestinal: Negative  Musculoskeletal: Negative  Neurological: Negative  Past Medical History:     No past medical history on file     Past Surgical History:     Past Surgical History:   Procedure Laterality Date    ESOPHAGOGASTRODUODENOSCOPY  02/08/2013    DIAGNOSTIC    SHOULDER SURGERY Right     [id number] LAST ASSESSED    TONSILLECTOMY AND ADENOIDECTOMY      [id number] LAST ASSESSED    VASECTOMY      [id number] LAST ASSESSED      Family History:     Family History   Problem Relation Age of Onset    Hypertension Mother     Diabetes Father         MELLITUS    Heart failure Father         CONGESTIVE    Diabetes type II Father     Heart disease Father         HEART VALVE REPLACEMENT    Diabetes Maternal Grandfather         MELLITUS      Social History:     Social History     Socioeconomic History    Marital status: /Civil Union     Spouse name: Not on file    Number of children: 2    Years of education: Not on file    Highest education level: Not on file   Occupational History    Occupation: 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   Social Needs    Financial resource strain: Not on file    Food insecurity:     Worry: Not on file     Inability: Not on file    Transportation needs:     Medical: Not on file     Non-medical: Not on file   Tobacco Use    Smoking status: Never Smoker    Smokeless tobacco: Never Used   Substance and Sexual Activity    Alcohol use: No    Drug use: No    Sexual activity: Not on file   Lifestyle    Physical activity:     Days per week: Not on file     Minutes per session: Not on file    Stress: Not on file   Relationships    Social connections:     Talks on phone: Not on file     Gets together: Not on file     Attends [employer] service: Not on file     Active member of club or organization: Not on file     Attends meetings of clubs or organizations: Not on file     Relationship status: Not on file    Intimate partner violence:     Fear of current or ex partner: Not on file     Emotionally abused: Not on file     Physically abused: Not on file     Forced sexual activity: Not on file   Other Topics Concern    Not on file   Social History Narrative    EXERCISING REGULARLY      Current Medications:     Current Outpatient Medications   Medication Sig Dispense Refill    ALPRAZolam (XANAX) 0 25 mg tablet Take 1 tablet (0 25 mg total) by mouth 2 (two) times a day as needed for anxiety 60 tablet 0    doxycycline (VIBRA-TABS) 100 mg tablet TAKE 1 TABLET BY MOUTH EVERY DAY 90 tablet 0    omeprazole (PriLOSEC) 40 MG capsule Take 1 capsule (40 mg total) by mouth daily 90 capsule 2     No current facility-administered medications for this visit  Allergies:     No Known Allergies   Physical Exam:     /84   Pulse 85   Temp 99 °F (37 2 °C) (Oral)   Resp 16   Ht 5' 7 52" (1 715 m)   Wt 80 2 kg (176 lb 12 8 oz)   SpO2 96%   BMI 27 27 kg/m²     Physical Exam   Constitutional: He is oriented to person, place, and time  He appears well-developed and well-nourished  HENT:   Head: Normocephalic and atraumatic  Right Ear: External ear normal    Left Ear: External ear normal    Nose: Nose normal    Mouth/Throat: Oropharynx is clear and moist    Eyes: Pupils are equal, round, and reactive to light  Conjunctivae are normal    Neck: Normal range of motion   Neck supple  Cardiovascular: Normal rate and regular rhythm  Pulmonary/Chest: Effort normal and breath sounds normal    Abdominal: Soft  Bowel sounds are normal    Musculoskeletal: Normal range of motion  Neurological: He is alert and oriented to person, place, and time  Skin: Skin is warm and dry  Nursing note and vitals reviewed        AMPARO Forman  [facility]

## 2019-10-25 NOTE — PATIENT INSTRUCTIONS

## 2019-11-03 DIAGNOSIS — K21.9 GASTROESOPHAGEAL REFLUX DISEASE, ESOPHAGITIS PRESENCE NOT SPECIFIED: ICD-10-CM

## 2019-11-03 RX ORDER — OMEPRAZOLE 40 MG/1
CAPSULE, DELAYED RELEASE ORAL
Qty: 90 CAPSULE | Refills: 2 | Status: SHIPPED | OUTPATIENT
Start: 2019-11-03

## 2019-12-02 ENCOUNTER — OFFICE VISIT (OUTPATIENT)
Dept: INTERNAL MEDICINE CLINIC | Facility: CLINIC | Age: 54
End: 2019-12-02
Payer: COMMERCIAL

## 2019-12-02 VITALS
OXYGEN SATURATION: 95 % | BODY MASS INDEX: 26.34 KG/M2 | HEART RATE: 84 BPM | HEIGHT: 68 IN | DIASTOLIC BLOOD PRESSURE: 78 MMHG | WEIGHT: 173.8 LBS | SYSTOLIC BLOOD PRESSURE: 140 MMHG

## 2019-12-02 DIAGNOSIS — F10.20 UNCOMPLICATED ALCOHOL DEPENDENCE (HCC): Primary | ICD-10-CM

## 2019-12-02 DIAGNOSIS — F41.9 ANXIETY: ICD-10-CM

## 2019-12-02 PROCEDURE — 3008F BODY MASS INDEX DOCD: CPT | Performed by: INTERNAL MEDICINE

## 2019-12-02 PROCEDURE — 99214 OFFICE O/P EST MOD 30 MIN: CPT | Performed by: INTERNAL MEDICINE

## 2019-12-02 RX ORDER — ALPRAZOLAM 0.25 MG/1
0.25 TABLET ORAL 2 TIMES DAILY PRN
Qty: 60 TABLET | Refills: 0 | Status: SHIPPED | OUTPATIENT
Start: 2019-12-02

## 2019-12-02 RX ORDER — DOXYCYCLINE HYCLATE 20 MG
20 TABLET ORAL DAILY
Refills: 0 | COMMUNITY
Start: 2019-11-12

## 2019-12-02 NOTE — PROGRESS NOTES
Assessment/Plan:  2 days since his last drink  No early signs of DTs  Rx for Xanax sent  He understands that this is not to be taken with alcohol  He is reaching out to work where alcohol rehab/counseling is offered  I instructed him to call here if he needs resources as I can refer him to our counselor  Problem List Items Addressed This Visit     None      Visit Diagnoses     Uncomplicated alcohol dependence ([facility] [address] )    -  Primary    Relevant Medications    ALPRAZolam (XANAX) 0 25 mg tablet    Anxiety        Relevant Medications    ALPRAZolam (XANAX) 0 25 mg tablet            Subjective:      Patient ID: Opal Chávez is a 62 y o  male  HPI  He initially called re: left shoulder pain but this has resolved  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  He is not taking anything for pain and he has full ROM  His main question today is possible issues with his meds and starting alcohol rehab  He admits to being sober for 2-3 years until 6months ago  He drinks 12-16oz a day  His wife recently found bottles in the basement  He is not sure if he can save his marriage at this point  There is a program through work he can call  He went to AA in the past    He has coped with the anxiety with alcohol  He was taking Xanax prn until a few days ago, since he stopped drinking  He has been taking it BID  His last drink was 2 days ago  The following portions of the patient's history were reviewed and updated as appropriate: allergies, current medications, past family history, past medical history, past surgical history and problem list     Review of Systems   Constitutional: Negative for fatigue, fever and unexpected weight change  HENT: Negative for congestion, sinus pressure, sinus pain and sore throat  Respiratory: Negative for cough, shortness of breath and wheezing  Cardiovascular: Negative for chest pain, palpitations and leg swelling     Gastrointestinal: Negative for abdominal pain, constipation, diarrhea, nausea and vomiting  Genitourinary: Negative for difficulty urinating  Musculoskeletal: Negative for arthralgias and myalgias  Neurological: Negative for dizziness and headaches  Psychiatric/Behavioral: Positive for dysphoric mood  Negative for hallucinations and suicidal ideas  The patient is nervous/anxious  Objective:      /78   Pulse 84   Ht 5' 7 52" (1 715 m)   Wt 78 8 kg (173 lb 12 8 oz)   SpO2 95%   BMI 26 80 kg/m²          Physical Exam   Constitutional: He is oriented to person, place, and time  He appears well-developed and well-nourished  HENT:   Head: Normocephalic and atraumatic  Right Ear: External ear normal    Left Ear: External ear normal    Mouth/Throat: Oropharynx is clear and moist    Eyes: Conjunctivae are normal    Neck: Neck supple  Cardiovascular: Normal rate, regular rhythm and normal heart sounds  No murmur heard  Pulmonary/Chest: Effort normal and breath sounds normal  No respiratory distress  He has no wheezes  He has no rales  Abdominal: Soft  He exhibits no distension and no mass  There is no tenderness  There is no rebound and no guarding  Musculoskeletal: Normal range of motion  Neurological: He is alert and oriented to person, place, and time  Skin: Skin is warm and dry  Psychiatric: His behavior is normal  Judgment and thought content normal  He exhibits a depressed mood

## 2019-12-04 ENCOUNTER — TELEPHONE (OUTPATIENT)
Dept: INTERNAL MEDICINE CLINIC | Facility: CLINIC | Age: 54
End: 2019-12-04

## 2019-12-05 ENCOUNTER — TELEPHONE (OUTPATIENT)
Dept: INTERNAL MEDICINE CLINIC | Facility: CLINIC | Age: 54
End: 2019-12-05

## 2019-12-05 NOTE — TELEPHONE ENCOUNTER
Michelle Loredo from the [facility] called to speak to you regarding this pt  Please call back at [phone]

## 2021-07-20 NOTE — TELEPHONE ENCOUNTER
Due for a f/u  Med refill approved this time
Scheduled for 4/23 @4pm
Detail Level: Detailed
Quality 130: Documentation Of Current Medications In The Medical Record: Current Medications Documented
Quality 431: Preventive Care And Screening: Unhealthy Alcohol Use - Screening: Patient screened for unhealthy alcohol use using a single question and scores less than 2 times per year
Quality 226: Preventive Care And Screening: Tobacco Use: Screening And Cessation Intervention: Patient screened for tobacco use and is an ex/non-smoker
Quality 110: Preventive Care And Screening: Influenza Immunization: Influenza Immunization Administered during Influenza season

## 2021-09-11 NOTE — PROGRESS NOTES
Hearing aid visit:    Name:  Shraddha Young  :  1965  Age:  45 y o  Date of Evaluation: 18     Shraddha Young is being seen for a hearing aid visit  Sharddha Young   is fit binaurally  with [employer] Opn 2 mini [employer] hearing aid(s) serial number [id number] right/ serial number [id number] left  Warranty 21  Patient reports no issues or concerns  Reported overall good sound quality  Noted an occasional static in the right hearing aid in very quite situations  Action:  Cleaned and checked  Listening check revealed typical circuitry noise bilaterally, counseled patient if it static increases to let me know  No adjustments needed today  Patient will be getting a new Iphone in the near future  Recommendations: Follow up once he gets an Iphone and we will pair hearing aids to his new phone          Aruna Trivedi , CCC-A  Clinical Audiologist Unknown if ever smoked